# Patient Record
Sex: MALE | Race: OTHER | Employment: STUDENT | URBAN - METROPOLITAN AREA
[De-identification: names, ages, dates, MRNs, and addresses within clinical notes are randomized per-mention and may not be internally consistent; named-entity substitution may affect disease eponyms.]

---
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
## 2021-07-21 ENCOUNTER — HOSPITAL ENCOUNTER (EMERGENCY)
Facility: HOSPITAL | Age: 21
Discharge: HOME | End: 2021-07-21
Payer: MEDICAID

## 2021-07-21 ENCOUNTER — APPOINTMENT (OUTPATIENT)
Dept: GENERAL RADIOLOGY | Facility: HOSPITAL | Age: 21
End: 2021-07-21
Payer: MEDICAID

## 2021-07-21 VITALS
HEIGHT: 68 IN | SYSTOLIC BLOOD PRESSURE: 128 MMHG | RESPIRATION RATE: 18 BRPM | HEART RATE: 81 BPM | WEIGHT: 153 LBS | OXYGEN SATURATION: 99 % | BODY MASS INDEX: 23.19 KG/M2 | DIASTOLIC BLOOD PRESSURE: 80 MMHG | TEMPERATURE: 99 F

## 2021-07-21 DIAGNOSIS — L08.9 INFECTED ABRASION OF RIGHT ANKLE, INITIAL ENCOUNTER: Primary | ICD-10-CM

## 2021-07-21 DIAGNOSIS — S90.511A INFECTED ABRASION OF RIGHT ANKLE, INITIAL ENCOUNTER: Primary | ICD-10-CM

## 2021-07-21 PROCEDURE — 73610 X-RAY EXAM OF ANKLE: CPT

## 2021-07-21 PROCEDURE — 99283 EMERGENCY DEPT VISIT LOW MDM: CPT

## 2021-07-21 RX ORDER — CEPHALEXIN 500 MG/1
500 CAPSULE ORAL 3 TIMES DAILY
Qty: 21 CAPSULE | Refills: 0 | Status: SHIPPED | OUTPATIENT
Start: 2021-07-21 | End: 2021-07-28

## 2021-07-21 NOTE — ED INITIAL ASSESSMENT (HPI)
Pt reports that he was lifting weight on Friday and he dropped the bar on his right ankle. He has an abrasion noted with swelling. Pt is concerned that the swelling hasnt improved.  Tetanus shot most likely not utd

## 2021-07-22 NOTE — ED PROVIDER NOTES
Patient Seen in: [facility] AND [facility] Emergency Department      History   Patient presents with:  Swelling Edema    Stated Complaint: right foot injury fri    HPI/Subjective:   HPI    15-year-old male presents to the emergency department with complaints of Findings: Erythema present.       Comments: 1.5 cm annular subacute circular laceration/ abrasion with scab to the right anterior ankle there is surrounding erythema and warmth no abscess or fluctuance/   Neurological:      General: No focal deficit present

## 2022-08-04 ENCOUNTER — TELEPHONE (OUTPATIENT)
Dept: GASTROENTEROLOGY | Facility: CLINIC | Age: 22
End: 2022-08-04

## 2022-08-04 ENCOUNTER — OFFICE VISIT (OUTPATIENT)
Dept: FAMILY MEDICINE CLINIC | Facility: CLINIC | Age: 22
End: 2022-08-04
Payer: MEDICAID

## 2022-08-04 VITALS
BODY MASS INDEX: 22.43 KG/M2 | WEIGHT: 148 LBS | HEIGHT: 68 IN | SYSTOLIC BLOOD PRESSURE: 137 MMHG | HEART RATE: 88 BPM | DIASTOLIC BLOOD PRESSURE: 82 MMHG

## 2022-08-04 DIAGNOSIS — R19.7 DIARRHEA, UNSPECIFIED TYPE: Primary | ICD-10-CM

## 2022-08-04 PROCEDURE — 3075F SYST BP GE 130 - 139MM HG: CPT | Performed by: FAMILY MEDICINE

## 2022-08-04 PROCEDURE — 99204 OFFICE O/P NEW MOD 45 MIN: CPT | Performed by: FAMILY MEDICINE

## 2022-08-04 PROCEDURE — 3079F DIAST BP 80-89 MM HG: CPT | Performed by: FAMILY MEDICINE

## 2022-08-04 PROCEDURE — 3008F BODY MASS INDEX DOCD: CPT | Performed by: FAMILY MEDICINE

## 2022-08-04 NOTE — TELEPHONE ENCOUNTER
GI RNs -    New patient. Please call Umm Mann to get him in for an expedited office visit with me week of 8/15/2022 or following week, Monday afternoon if possible. Urgent. Dr. Mason Mabry called me regarding this patient who has been suffering at least several weeks of chronic diarrhea possibly bloody. Dr. Mason Mabry is ordering stool studies. Outside labs show mildly elevated CRP.     - cb

## 2022-08-04 NOTE — TELEPHONE ENCOUNTER
Dr Shay Lin,    I spoke to the pt and he accepted 08/22/22 @ 2:30    He will get the stool cultures completed tomorrow

## 2022-08-04 NOTE — TELEPHONE ENCOUNTER
Dr Vi Orlando,    Pt declined appt on 08/15/22     Pt is leaving for [facility] on 08/10/22 & returning on 08/21/22    First appt upon your return is 09/16/2022

## 2022-08-04 NOTE — TELEPHONE ENCOUNTER
Thanks Tona Wen!! If Nicole Montes keeps getting worse, he may need to come back earlier from [facility].  I could see him Monday afternoon 8/22 or at 4 PM end of day on 8/25/2022, otherwise Monday afternoon 8/29/2022. If he can come in for those stool tests today or tomorrow, I could send in a prescription for prednisone which will probably help.   I need to see those stool tests first.

## 2022-08-05 ENCOUNTER — LAB ENCOUNTER (OUTPATIENT)
Dept: LAB | Facility: HOSPITAL | Age: 22
End: 2022-08-05
Attending: FAMILY MEDICINE
Payer: MEDICAID

## 2022-08-05 DIAGNOSIS — R19.7 DIARRHEA, UNSPECIFIED TYPE: ICD-10-CM

## 2022-08-05 LAB
CRYPTOSP AG STL QL IA: NEGATIVE
G LAMBLIA AG STL QL IA: NEGATIVE

## 2022-08-05 PROCEDURE — 83993 ASSAY FOR CALPROTECTIN FECAL: CPT

## 2022-08-05 PROCEDURE — 87272 CRYPTOSPORIDIUM AG IF: CPT

## 2022-08-05 PROCEDURE — 87493 C DIFF AMPLIFIED PROBE: CPT

## 2022-08-05 PROCEDURE — 87329 GIARDIA AG IA: CPT

## 2022-08-06 LAB — C DIFF TOX B STL QL: NEGATIVE

## 2022-08-09 LAB — CALPROTECTIN STL-MCNT: >800 ΜG/G (ref ?–50)

## 2022-08-29 ENCOUNTER — OFFICE VISIT (OUTPATIENT)
Dept: GASTROENTEROLOGY | Facility: CLINIC | Age: 22
End: 2022-08-29
Payer: MEDICAID

## 2022-08-29 ENCOUNTER — TELEPHONE (OUTPATIENT)
Dept: GASTROENTEROLOGY | Facility: CLINIC | Age: 22
End: 2022-08-29

## 2022-08-29 VITALS
SYSTOLIC BLOOD PRESSURE: 131 MMHG | HEART RATE: 79 BPM | HEIGHT: 68 IN | DIASTOLIC BLOOD PRESSURE: 76 MMHG | WEIGHT: 148 LBS | BODY MASS INDEX: 22.43 KG/M2

## 2022-08-29 DIAGNOSIS — R19.4 CHANGE IN BOWEL HABITS: Primary | ICD-10-CM

## 2022-08-29 DIAGNOSIS — K92.1 BLOOD IN STOOL: ICD-10-CM

## 2022-08-29 DIAGNOSIS — R19.7 DIARRHEA, UNSPECIFIED TYPE: ICD-10-CM

## 2022-08-29 RX ORDER — POLYETHYLENE GLYCOL 3350, SODIUM CHLORIDE, SODIUM BICARBONATE, POTASSIUM CHLORIDE 420; 11.2; 5.72; 1.48 G/4L; G/4L; G/4L; G/4L
POWDER, FOR SOLUTION ORAL
Qty: 4000 ML | Refills: 0 | Status: SHIPPED | OUTPATIENT
Start: 2022-08-29

## 2022-08-29 NOTE — TELEPHONE ENCOUNTER
Scheduled for: Colonoscopy [address] per Traci (LEONOR) and Dr. Andrew Leroy  Provider Name:  Dr Andrew Leroy  Date:  10/11/2022  Location:  [facility]  Sedation:  MAC  Time: 3:30pm, arrival 2:30pm  Prep:  Golytely  Meds/Allergies Reconciled?:  Physician reviewed     Diagnosis with codes:  Change in bowel habits R19.4, Blood in stool K92.1  Was patient informed to call insurance with codes (Y/N):  Yes      Referral sent?:  Referral was sent at the time of electronic surgical scheduling. [facility] or [facility] Th  notified?:  I sent an electronic request to Endo Scheduling and received a confirmation today.      Medication Orders:  N/A  Misc Orders:  N/A     Further instructions given by staff:  Prep instructions were given to patient

## 2022-10-10 ENCOUNTER — TELEPHONE (OUTPATIENT)
Dept: GASTROENTEROLOGY | Facility: CLINIC | Age: 22
End: 2022-10-10

## 2022-10-10 DIAGNOSIS — R19.4 CHANGE IN BOWEL HABITS: Primary | ICD-10-CM

## 2022-10-10 DIAGNOSIS — K92.1 BLOOD IN STOOL: ICD-10-CM

## 2022-10-10 NOTE — TELEPHONE ENCOUNTER
See message below- cancelled in epic and endo.     CANCELLED for: Colonoscopy [address] per Traci (ENDO) and Dr. Harris Davis  Provider Name:  Dr Harris Davis  Date:  10/11/2022  Location:  [facility]  Sedation:  MAC  Time: 3:30pm, arrival 2:30pm

## 2023-06-01 ENCOUNTER — TELEPHONE (OUTPATIENT)
Facility: CLINIC | Age: 23
End: 2023-06-01

## 2023-06-01 ENCOUNTER — HOSPITAL ENCOUNTER (EMERGENCY)
Facility: HOSPITAL | Age: 23
Discharge: HOME | End: 2023-06-01
Attending: EMERGENCY MEDICINE
Payer: MEDICAID

## 2023-06-01 VITALS
OXYGEN SATURATION: 99 % | HEART RATE: 70 BPM | DIASTOLIC BLOOD PRESSURE: 73 MMHG | RESPIRATION RATE: 18 BRPM | SYSTOLIC BLOOD PRESSURE: 132 MMHG | TEMPERATURE: 99 F

## 2023-06-01 DIAGNOSIS — R19.7 DIARRHEA, UNSPECIFIED TYPE: Primary | ICD-10-CM

## 2023-06-01 LAB
ALBUMIN SERPL-MCNC: 3.5 G/DL (ref 3.4–5)
ALBUMIN/GLOB SERPL: 0.9 {RATIO} (ref 1–2)
ALP LIVER SERPL-CCNC: 104 U/L
ALT SERPL-CCNC: 33 U/L
ANION GAP SERPL CALC-SCNC: 10 MMOL/L (ref 0–18)
AST SERPL-CCNC: 22 U/L (ref 15–37)
BASOPHILS # BLD AUTO: 0.06 X10(3) UL (ref 0–0.2)
BASOPHILS NFR BLD AUTO: 0.5 %
BILIRUB SERPL-MCNC: 0.3 MG/DL (ref 0.1–2)
BUN BLD-MCNC: 16 MG/DL (ref 7–18)
BUN/CREAT SERPL: 16.3 (ref 10–20)
CALCIUM BLD-MCNC: 8.7 MG/DL (ref 8.5–10.1)
CHLORIDE SERPL-SCNC: 107 MMOL/L (ref 98–112)
CO2 SERPL-SCNC: 25 MMOL/L (ref 21–32)
CREAT BLD-MCNC: 0.98 MG/DL
DEPRECATED RDW RBC AUTO: 39.9 FL (ref 35.1–46.3)
EOSINOPHIL # BLD AUTO: 0.47 X10(3) UL (ref 0–0.7)
EOSINOPHIL NFR BLD AUTO: 4.1 %
ERYTHROCYTE [DISTWIDTH] IN BLOOD BY AUTOMATED COUNT: 13.6 % (ref 11–15)
GFR SERPLBLD BASED ON 1.73 SQ M-ARVRAT: 111 ML/MIN/1.73M2 (ref 60–?)
GLOBULIN PLAS-MCNC: 3.8 G/DL (ref 2.8–4.4)
GLUCOSE BLD-MCNC: 95 MG/DL (ref 70–99)
HCT VFR BLD AUTO: 41.1 %
HGB BLD-MCNC: 13.8 G/DL
IMM GRANULOCYTES # BLD AUTO: 0.03 X10(3) UL (ref 0–1)
IMM GRANULOCYTES NFR BLD: 0.3 %
LYMPHOCYTES # BLD AUTO: 2.59 X10(3) UL (ref 1–4)
LYMPHOCYTES NFR BLD AUTO: 22.4 %
MCH RBC QN AUTO: 27.1 PG (ref 26–34)
MCHC RBC AUTO-ENTMCNC: 33.6 G/DL (ref 31–37)
MCV RBC AUTO: 80.6 FL
MONOCYTES # BLD AUTO: 1.6 X10(3) UL (ref 0.1–1)
MONOCYTES NFR BLD AUTO: 13.8 %
NEUTROPHILS # BLD AUTO: 6.82 X10 (3) UL (ref 1.5–7.7)
NEUTROPHILS # BLD AUTO: 6.82 X10(3) UL (ref 1.5–7.7)
NEUTROPHILS NFR BLD AUTO: 58.9 %
OSMOLALITY SERPL CALC.SUM OF ELEC: 295 MOSM/KG (ref 275–295)
PLATELET # BLD AUTO: 287 10(3)UL (ref 150–450)
POTASSIUM SERPL-SCNC: 3.7 MMOL/L (ref 3.5–5.1)
PROT SERPL-MCNC: 7.3 G/DL (ref 6.4–8.2)
RBC # BLD AUTO: 5.1 X10(6)UL
SODIUM SERPL-SCNC: 142 MMOL/L (ref 136–145)
WBC # BLD AUTO: 11.6 X10(3) UL (ref 4–11)

## 2023-06-01 PROCEDURE — 80053 COMPREHEN METABOLIC PANEL: CPT | Performed by: EMERGENCY MEDICINE

## 2023-06-01 PROCEDURE — 99283 EMERGENCY DEPT VISIT LOW MDM: CPT

## 2023-06-01 PROCEDURE — 36415 COLL VENOUS BLD VENIPUNCTURE: CPT

## 2023-06-01 PROCEDURE — 99284 EMERGENCY DEPT VISIT MOD MDM: CPT

## 2023-06-01 PROCEDURE — 85025 COMPLETE CBC W/AUTO DIFF WBC: CPT | Performed by: EMERGENCY MEDICINE

## 2023-06-01 NOTE — CM/SW NOTE
Per ERMD, patient needs a close follow up with Dr. Radha Gurrola, GI post discharge from ER. Patient saw Dr. Radha Gurrola October 2022 and had a scheduled colonoscopy that was cancelled due to resolutions of patient's symptoms. Patient now in ER for diarrhea with increasing amounts of blood, so called Dr. Elinor Ross office at [id number] and spoke with [facility] to schedule an appt. [facility] stated that Dr. Radha Gurrola is scheduling out to the Fall 2023 already and she will send him a message to see if he can see patient sooner. [facility] stated that they will call patient with f/u appt. [facility] called patient and informed him of above and he v/u.    930am    Received call from [employer] with Dr. Radha Gurrola and she stated Dr. Radha Gurrola is out of town until next week but that she will speak with GI physicians to see if patient can be scheduled for an early appt. Donny Delacruz RN stated that they will reach out to patient with appt.

## 2023-06-01 NOTE — DISCHARGE INSTRUCTIONS
Stay hydrated. Make an appointment to be seen by gastroenterology for follow-up. Return to the ER if you develop worsening symptoms, inability to tolerate fluids, or any emergent concerns.

## 2023-06-01 NOTE — TELEPHONE ENCOUNTER
I spoke with Umm/ER . She states patient needs to make a ER f/u appointment with Dr. Shay Lin for diarrhea/blood in stool. Dr. Shay Lin is out of the office this week. Patient contacted and appointment made for 06/05/2023 at 9 am at [facility] with Dr. Shay Lin. Patient advised to arrive 15 minutes prior to appointment and address given. Patient voiced understanding.

## 2023-06-01 NOTE — TELEPHONE ENCOUNTER
Umm/ED Case Manager called to see if pt could see Dr. Don Galindo earlier than first available. Pt was seen in ED for diarrhea with blood in it today. Please call pt to schedule appt.

## 2023-06-01 NOTE — ED INITIAL ASSESSMENT (HPI)
Patient presents with diarrhea for about a month and noticing more blood with it. Pt states this happens every year around this time - had seen a GI and was going to get an endoscopy but cancelled the apt d/t symptoms resolving.

## 2023-06-02 ENCOUNTER — OFFICE VISIT (OUTPATIENT)
Dept: FAMILY MEDICINE CLINIC | Facility: CLINIC | Age: 23
End: 2023-06-02

## 2023-06-02 VITALS
RESPIRATION RATE: 26 BRPM | OXYGEN SATURATION: 97 % | WEIGHT: 148 LBS | HEIGHT: 68 IN | BODY MASS INDEX: 22.43 KG/M2 | DIASTOLIC BLOOD PRESSURE: 80 MMHG | HEART RATE: 80 BPM | SYSTOLIC BLOOD PRESSURE: 116 MMHG

## 2023-06-02 DIAGNOSIS — R19.7 DIARRHEA, UNSPECIFIED TYPE: Primary | ICD-10-CM

## 2023-06-02 PROCEDURE — 3008F BODY MASS INDEX DOCD: CPT | Performed by: FAMILY MEDICINE

## 2023-06-02 PROCEDURE — 3074F SYST BP LT 130 MM HG: CPT | Performed by: FAMILY MEDICINE

## 2023-06-02 PROCEDURE — 3079F DIAST BP 80-89 MM HG: CPT | Performed by: FAMILY MEDICINE

## 2023-06-02 PROCEDURE — 99213 OFFICE O/P EST LOW 20 MIN: CPT | Performed by: FAMILY MEDICINE

## 2023-06-05 ENCOUNTER — OFFICE VISIT (OUTPATIENT)
Facility: CLINIC | Age: 23
End: 2023-06-05

## 2023-06-05 ENCOUNTER — TELEPHONE (OUTPATIENT)
Facility: CLINIC | Age: 23
End: 2023-06-05

## 2023-06-05 VITALS
HEIGHT: 68 IN | DIASTOLIC BLOOD PRESSURE: 76 MMHG | SYSTOLIC BLOOD PRESSURE: 123 MMHG | WEIGHT: 147.5 LBS | BODY MASS INDEX: 22.35 KG/M2 | HEART RATE: 91 BPM

## 2023-06-05 DIAGNOSIS — K92.1 BLOODY STOOLS: ICD-10-CM

## 2023-06-05 DIAGNOSIS — R79.82 ELEVATED C-REACTIVE PROTEIN (CRP): ICD-10-CM

## 2023-06-05 DIAGNOSIS — K92.1 BLOOD IN STOOL: ICD-10-CM

## 2023-06-05 DIAGNOSIS — R19.5 ELEVATED FECAL CALPROTECTIN: ICD-10-CM

## 2023-06-05 DIAGNOSIS — R19.7 DIARRHEA, UNSPECIFIED TYPE: Primary | ICD-10-CM

## 2023-06-05 DIAGNOSIS — R19.5 ABNORMAL STOOL TEST: Primary | ICD-10-CM

## 2023-06-05 RX ORDER — POLYETHYLENE GLYCOL 3350, SODIUM SULFATE ANHYDROUS, SODIUM BICARBONATE, SODIUM CHLORIDE, POTASSIUM CHLORIDE 236; 22.74; 6.74; 5.86; 2.97 G/4L; G/4L; G/4L; G/4L; G/4L
4 POWDER, FOR SOLUTION ORAL ONCE
Qty: 1 EACH | Refills: 0 | Status: SHIPPED | OUTPATIENT
Start: 2023-06-05 | End: 2023-06-05

## 2023-06-05 RX ORDER — PREDNISONE 10 MG/1
40 TABLET ORAL DAILY
Qty: 120 TABLET | Refills: 2 | Status: SHIPPED | OUTPATIENT
Start: 2023-06-05 | End: 2023-07-05

## 2023-06-05 NOTE — TELEPHONE ENCOUNTER
Excused letter  For work was generated and was given to patient today. Scheduled for:  Colonoscopy [address] , [id number]   Provider Name:  Geoff Vasquez  Date:  6/12/2023  Location:  [facility]   Sedation:  Mac   AENO:9639 Am (pt is aware to arrive at 0730 Am )   Prep: Split dose Golytely  Prep instructions were given to pt in the office, I discuss prep Instructions with patient at the time of the appointment which he verbally understood and given the prep instructions at the time of the appointment  Meds/Allergies Reconciled?:  Physician reviewed   Diagnosis with codes:  Bloody diarrhea K92.1, Abnormal stool test R19.5  Was patient informed to call insurance with codes (Y/N):  Yes, I confirmed MEDICAID REPLACEMENT  insurance with the patient. The patient also verbally understands to call his  insurance to check for pre-cert, codes were given on prep instructions. Referral sent?:  Referral was sent at the time of electronic surgical scheduling. [address] or [address] notified?:  I sent an electronic request to Endo Scheduling and received a confirmation today. Medication Orders: This patient verbally confirmed that he is not taking:   Iron, blood thinners, BP meds, and is not diabetic   Not latex allergy, Not PCN allergy and does not have a pacemaker Pt is aware to NOT take iron pills, herbal meds and diet supplements for 7 days before exam. Also to NOT take any form of alcohol, recreational drugs and any forms of ED meds 24 hours before exam.    Misc Orders:  Patient verbally understood & will await a phone call from [facility] to schedule.       Further instructions given by staff:

## 2023-06-12 ENCOUNTER — HOSPITAL ENCOUNTER (OUTPATIENT)
Facility: HOSPITAL | Age: 23
Setting detail: HOSPITAL OUTPATIENT SURGERY
Discharge: HOME | End: 2023-06-12
Attending: INTERNAL MEDICINE | Admitting: INTERNAL MEDICINE
Payer: MEDICAID

## 2023-06-12 ENCOUNTER — ANESTHESIA (OUTPATIENT)
Dept: ENDOSCOPY | Facility: HOSPITAL | Age: 23
End: 2023-06-12
Payer: MEDICAID

## 2023-06-12 ENCOUNTER — LAB ENCOUNTER (OUTPATIENT)
Dept: LAB | Facility: HOSPITAL | Age: 23
End: 2023-06-12
Attending: INTERNAL MEDICINE
Payer: MEDICAID

## 2023-06-12 ENCOUNTER — TELEPHONE (OUTPATIENT)
Dept: GASTROENTEROLOGY | Facility: CLINIC | Age: 23
End: 2023-06-12

## 2023-06-12 ENCOUNTER — ANESTHESIA EVENT (OUTPATIENT)
Dept: ENDOSCOPY | Facility: HOSPITAL | Age: 23
End: 2023-06-12
Payer: MEDICAID

## 2023-06-12 VITALS
TEMPERATURE: 98 F | BODY MASS INDEX: 22.73 KG/M2 | HEIGHT: 68 IN | RESPIRATION RATE: 20 BRPM | SYSTOLIC BLOOD PRESSURE: 126 MMHG | OXYGEN SATURATION: 98 % | WEIGHT: 150 LBS | HEART RATE: 75 BPM | DIASTOLIC BLOOD PRESSURE: 77 MMHG

## 2023-06-12 DIAGNOSIS — K92.1 BLOODY STOOLS: ICD-10-CM

## 2023-06-12 DIAGNOSIS — Z79.620 ENCOUNTER FOR MONITORING OF ADALIMUMAB THERAPY: ICD-10-CM

## 2023-06-12 DIAGNOSIS — Z51.81 ENCOUNTER FOR MONITORING OF ADALIMUMAB THERAPY: ICD-10-CM

## 2023-06-12 DIAGNOSIS — K52.9 CHRONIC DIARRHEA: ICD-10-CM

## 2023-06-12 DIAGNOSIS — K51.011 ULCERATIVE PANCOLITIS WITH RECTAL BLEEDING (HCC): Primary | ICD-10-CM

## 2023-06-12 DIAGNOSIS — K51.011 ULCERATIVE PANCOLITIS WITH RECTAL BLEEDING (HCC): ICD-10-CM

## 2023-06-12 DIAGNOSIS — R19.5 ABNORMAL STOOL TEST: ICD-10-CM

## 2023-06-12 LAB
ALBUMIN SERPL-MCNC: 3.3 G/DL (ref 3.4–5)
CRP SERPL-MCNC: 2.79 MG/DL (ref ?–0.3)
DEPRECATED RDW RBC AUTO: 40.5 FL (ref 35.1–46.3)
ERYTHROCYTE [DISTWIDTH] IN BLOOD BY AUTOMATED COUNT: 13.5 % (ref 11–15)
HAV AB SER QL IA: REACTIVE
HBV CORE AB SERPL QL IA: NONREACTIVE
HBV SURFACE AB SER QL: NONREACTIVE
HBV SURFACE AB SERPL IA-ACNC: <3.1 MIU/ML
HBV SURFACE AG SERPL QL IA: NONREACTIVE
HCT VFR BLD AUTO: 43 %
HCV AB SERPL QL IA: NONREACTIVE
HGB BLD-MCNC: 13.6 G/DL
MCH RBC QN AUTO: 26.1 PG (ref 26–34)
MCHC RBC AUTO-ENTMCNC: 31.6 G/DL (ref 31–37)
MCV RBC AUTO: 82.5 FL
PLATELET # BLD AUTO: 407 10(3)UL (ref 150–450)
RBC # BLD AUTO: 5.21 X10(6)UL
WBC # BLD AUTO: 14.9 X10(3) UL (ref 4–11)

## 2023-06-12 PROCEDURE — 86706 HEP B SURFACE ANTIBODY: CPT

## 2023-06-12 PROCEDURE — 0DDP8ZX EXTRACTION OF RECTUM, VIA NATURAL OR ARTIFICIAL OPENING ENDOSCOPIC, DIAGNOSTIC: ICD-10-PCS | Performed by: INTERNAL MEDICINE

## 2023-06-12 PROCEDURE — 85027 COMPLETE CBC AUTOMATED: CPT

## 2023-06-12 PROCEDURE — 36415 COLL VENOUS BLD VENIPUNCTURE: CPT

## 2023-06-12 PROCEDURE — 82040 ASSAY OF SERUM ALBUMIN: CPT

## 2023-06-12 PROCEDURE — 0DDN8ZX EXTRACTION OF SIGMOID COLON, VIA NATURAL OR ARTIFICIAL OPENING ENDOSCOPIC, DIAGNOSTIC: ICD-10-PCS | Performed by: INTERNAL MEDICINE

## 2023-06-12 PROCEDURE — 86140 C-REACTIVE PROTEIN: CPT

## 2023-06-12 PROCEDURE — 45380 COLONOSCOPY AND BIOPSY: CPT | Performed by: INTERNAL MEDICINE

## 2023-06-12 PROCEDURE — 86708 HEPATITIS A ANTIBODY: CPT

## 2023-06-12 PROCEDURE — 86803 HEPATITIS C AB TEST: CPT

## 2023-06-12 PROCEDURE — 0DDK8ZX EXTRACTION OF ASCENDING COLON, VIA NATURAL OR ARTIFICIAL OPENING ENDOSCOPIC, DIAGNOSTIC: ICD-10-PCS | Performed by: INTERNAL MEDICINE

## 2023-06-12 PROCEDURE — 80503 PATH CLIN CONSLTJ SF 5-20: CPT

## 2023-06-12 PROCEDURE — 0DDL8ZX EXTRACTION OF TRANSVERSE COLON, VIA NATURAL OR ARTIFICIAL OPENING ENDOSCOPIC, DIAGNOSTIC: ICD-10-PCS | Performed by: INTERNAL MEDICINE

## 2023-06-12 PROCEDURE — 86704 HEP B CORE ANTIBODY TOTAL: CPT

## 2023-06-12 PROCEDURE — 86480 TB TEST CELL IMMUN MEASURE: CPT

## 2023-06-12 PROCEDURE — 87340 HEPATITIS B SURFACE AG IA: CPT

## 2023-06-12 PROCEDURE — 86709 HEPATITIS A IGM ANTIBODY: CPT

## 2023-06-12 RX ORDER — MIDAZOLAM HYDROCHLORIDE 1 MG/ML
INJECTION INTRAMUSCULAR; INTRAVENOUS AS NEEDED
Status: DISCONTINUED | OUTPATIENT
Start: 2023-06-12 | End: 2023-06-12 | Stop reason: SURG

## 2023-06-12 RX ORDER — SODIUM CHLORIDE, SODIUM LACTATE, POTASSIUM CHLORIDE, CALCIUM CHLORIDE 600; 310; 30; 20 MG/100ML; MG/100ML; MG/100ML; MG/100ML
INJECTION, SOLUTION INTRAVENOUS CONTINUOUS
Status: DISCONTINUED | OUTPATIENT
Start: 2023-06-12 | End: 2023-06-12

## 2023-06-12 RX ORDER — NALOXONE HYDROCHLORIDE 0.4 MG/ML
80 INJECTION, SOLUTION INTRAMUSCULAR; INTRAVENOUS; SUBCUTANEOUS AS NEEDED
Status: DISCONTINUED | OUTPATIENT
Start: 2023-06-12 | End: 2023-06-12

## 2023-06-12 RX ORDER — LIDOCAINE HYDROCHLORIDE 10 MG/ML
INJECTION, SOLUTION EPIDURAL; INFILTRATION; INTRACAUDAL; PERINEURAL AS NEEDED
Status: DISCONTINUED | OUTPATIENT
Start: 2023-06-12 | End: 2023-06-12 | Stop reason: SURG

## 2023-06-12 RX ADMIN — MIDAZOLAM HYDROCHLORIDE 2 MG: 1 INJECTION INTRAMUSCULAR; INTRAVENOUS at 09:03:00

## 2023-06-12 RX ADMIN — SODIUM CHLORIDE, SODIUM LACTATE, POTASSIUM CHLORIDE, CALCIUM CHLORIDE: 600; 310; 30; 20 INJECTION, SOLUTION INTRAVENOUS at 09:34:00

## 2023-06-12 RX ADMIN — LIDOCAINE HYDROCHLORIDE 50 MG: 10 INJECTION, SOLUTION EPIDURAL; INFILTRATION; INTRACAUDAL; PERINEURAL at 09:06:00

## 2023-06-12 RX ADMIN — SODIUM CHLORIDE, SODIUM LACTATE, POTASSIUM CHLORIDE, CALCIUM CHLORIDE: 600; 310; 30; 20 INJECTION, SOLUTION INTRAVENOUS at 09:03:00

## 2023-06-12 NOTE — TELEPHONE ENCOUNTER
Colonoscopy exam today verifies IBD/ ulcerative colitis; Mcelroy score 2. Pre-treatment labs ordered today.     - cb

## 2023-06-12 NOTE — ANESTHESIA POSTPROCEDURE EVALUATION
Patient: Tucker Frances    Procedure Summary     Date: 06/12/23 Room / Location: [address] [place] [address] ENDOSCOPY 01 / [facility] ENDOSCOPY    Anesthesia Start: 0806 Anesthesia Stop: 6003    Procedure: COLONOSCOPY Diagnosis:       Abnormal stool test      Bloody stools      (colitis )    Surgeons: Lora Hidalgo MD Anesthesiologist: Kelton Lynne MD    Anesthesia Type: MAC, general ASA Status: 2          Anesthesia Type: MAC, general    Vitals Value Taken Time   /70 06/12/23 0934   Temp - 06/12/23 0935   Pulse 80 06/12/23 0934   Resp 16 06/12/23 0934   SpO2 99 % 06/12/23 0934       [facility] AN Post Evaluation:   Patient Evaluated in PACU  Patient Participation: complete - patient participated  Level of Consciousness: awake and alert  Pain Score: 0  Pain Management: adequate  Airway Patency:patent  Dental exam unchanged from preop  Yes    Cardiovascular Status: acceptable  Respiratory Status: acceptable and nasal cannula  Postoperative Hydration acceptable      Caesar Cabezas CRNA  6/12/2023 9:35 AM

## 2023-06-12 NOTE — TELEPHONE ENCOUNTER
GI RNs-    Colonoscopy examination performed today shows ulcerative colitis as expected. I asked Richa Stoddard to go today for more labs to prepare for treatment of the ulcerative colitis. Please call . Neal Ron this week to follow-up. Please ask him whether he has made a decision regarding taking Humira self injections every 14 days versus Remicade IV infusions every 8 weeks. We discussed this briefly after today's colonoscopy examination. We need to order 1 of those treatments for him. Richa Stoddard is on prednisone. He should be on 4 pills / 40 mg/day and feel he is getting better. Once he is sure things are improving, he should go down to 3 pills or 30 mg for 5 days and then call us to check in. Please try to find Richa Stoddard a follow-up appointment with me in 3 to 4 weeks and another appointment about 6 weeks after that. Mondays or RN hold spots are okay.     - cb

## 2023-06-12 NOTE — DISCHARGE INSTRUCTIONS
.  .  .  Notes from Dr. Aldridge Dies:    Today's colonoscopy examination shows \"ulcerative colitis,\" which is what I suspected. This is a chronic inflammation disease of the colon, or large intestine. I took biopsies during today's exam to verify the diagnosis. You will probably see more bleeding today. Please continue on that prednisone medication until you start feeling better, hopefully in the next week or so. Once you start feeling better we can reduce the dose of the prednisone down to 30 mg/day and down from there. Prednisone is only temporary. We will need to start you on a long-term medication to keep this colitis under control to give you relief from the recent symptoms and so that it does not get any worse. [facility] Instructions for Colonoscopy with Sedation    Diet:  - Resume your regular diet as tolerated unless otherwise instructed. - Start with light meals to minimize bloating.  - Do not drink alcohol today. Medication:  - If you have questions about resuming your normal medications, please contact your Primary Care Physician. Activities:  - Take it easy today. Do not return to work today. - Do not drive today. - Do not operate any machinery today (including kitchen equipment). Colonoscopy:  - You may notice some rectal \"spotting\" (a little blood on the toilet tissue) for a day or two after the exam. This is normal.  - If you experience any rectal bleeding (not spotting), persistent tenderness or sharp severe abdominal pains, oral temperature over 100 degrees Fahrenheit, light-headedness or dizziness, or any other problems, contact your doctor. **If unable to reach your doctor, please go to the [facility] Emergency Room**    - Your referring physician will receive a full report of your examination.  - If you do not hear from your doctor's office within two weeks of your biopsy, please call them for your results.     You may be able to see your laboratory results in [place] between 4 and 7 business days. In some cases, your physician may not have viewed the results before they are released to [address] If you have questions regarding your results contact the physician who ordered the test/exam by phone or via [address] by choosing \"Ask a Medical Question. \"

## 2023-06-13 LAB — HAV IGM SER QL: NONREACTIVE

## 2023-06-14 LAB
M TB IFN-G CD4+ T-CELLS BLD-ACNC: 0.06 IU/ML
M TB TUBERC IFN-G BLD QL: NEGATIVE
M TB TUBERC IGNF/MITOGEN IGNF CONTROL: >10 IU/ML
QFT TB1 AG MINUS NIL: -0.02 IU/ML
QFT TB2 AG MINUS NIL: -0.02 IU/ML

## 2023-06-14 NOTE — TELEPHONE ENCOUNTER
Dr. Janey Koyanagi,    I spoke to Amarilis, states he is doing ok. He doesn't feel he is very well informed on the two different medications and what would be best for him. He wanted to get some guidance from you on what you think would be best and will go that route, he is not opposed to either/or. He also requested a note for work to excuse him until Saturday 6/17 due to pain in upper part of stomach, feels like there is a golf ball stuck there. Denies trouble breathing/swallowing. Please advise, thank you!

## 2023-06-20 RX ORDER — ADALIMUMAB 40MG/0.4ML
KIT SUBCUTANEOUS
Qty: 2 EACH | Refills: 11 | Status: SHIPPED | OUTPATIENT
Start: 2023-06-20

## 2023-06-20 RX ORDER — ADALIMUMAB 80MG/0.8ML
KIT SUBCUTANEOUS
Qty: 3 EACH | Refills: 0 | Status: SHIPPED | OUTPATIENT
Start: 2023-06-20

## 2023-06-20 NOTE — TELEPHONE ENCOUNTER
GI RNs,    See below. Please call Biju Lai to arrange for him to come  the letter that I wrote today.     - cb

## 2023-06-21 NOTE — TELEPHONE ENCOUNTER
Spoke to patient and let him know that letter was placed at [facility] [employer] gastro . Location provided.

## 2023-06-22 ENCOUNTER — TELEPHONE (OUTPATIENT)
Facility: CLINIC | Age: 23
End: 2023-06-22

## 2023-06-23 NOTE — TELEPHONE ENCOUNTER
Called patient ,verified , in regards to a letter for him to  in the office. Patient stated he already pick them up and I told him he has a copy also in My Chart ,he said oh okay and was thankful and appreciated with this phone calls.

## 2023-07-06 ENCOUNTER — TELEPHONE (OUTPATIENT)
Facility: CLINIC | Age: 23
End: 2023-07-06

## 2023-07-06 NOTE — TELEPHONE ENCOUNTER
Also see 6/22/2023 TE - patient states he received a phone call from [address] [facility] regarding Humira and was told he only has 24 hours to have forms completed before rx is placed on hold. Patient was told they are still waiting on response from doctor's office regarding additional information needed. Please call [facility] and then call patient with update. Thank you.

## 2023-07-06 NOTE — TELEPHONE ENCOUNTER
I spoke to CPT Neto @ [facility]. I gave her the information she needed for the clarification.     I called Niki Cox to let him know    He will call [facility] back to set up delivery

## 2023-08-28 ENCOUNTER — TELEPHONE (OUTPATIENT)
Facility: CLINIC | Age: 23
End: 2023-08-28

## 2023-08-28 DIAGNOSIS — R19.7 DIARRHEA, UNSPECIFIED TYPE: ICD-10-CM

## 2023-08-28 DIAGNOSIS — K51.011 ULCERATIVE PANCOLITIS WITH RECTAL BLEEDING (HCC): Primary | ICD-10-CM

## 2023-08-28 DIAGNOSIS — Z79.620 ENCOUNTER FOR MONITORING OF ADALIMUMAB THERAPY: ICD-10-CM

## 2023-08-28 DIAGNOSIS — Z51.81 ENCOUNTER FOR MONITORING OF ADALIMUMAB THERAPY: ICD-10-CM

## 2023-08-28 DIAGNOSIS — R19.7 BLOODY DIARRHEA: ICD-10-CM

## 2023-08-28 DIAGNOSIS — K51.911: ICD-10-CM

## 2023-08-28 DIAGNOSIS — D84.9 IMMUNOSUPPRESSION (HCC): ICD-10-CM

## 2023-08-28 NOTE — TELEPHONE ENCOUNTER
I spoke to [employer] with UC flare    About 2-3 weeks ago pt started to have 10-15 bloody bowel movements/day    He wakes up every morning needing to have a bowel movement    He does not have any nocturnal urges    He does experience cramping prior to bowel movements but no abdominal pain    He is taking his Humira every two weeks. Pt doesn't think the Humira ever really helped him    Pt received his  first starter dose on 07/14/2023 then 2 weeks later the second 80 mg/ml dose    He has now taken 2 maintenance doses of Humira 40mg/ml. He most recently took Humira this past Friday 08/25/2023    I scheduled a follow up appt for him.     Your Appointments      Monday September 11, 2023 11:00 AM  Follow Up Visit with MD Bentley Ambriz, [address] [place] [address],[address], [facility]) [facility],[address]  [phone]

## 2023-08-29 RX ORDER — PREDNISONE 10 MG/1
40 TABLET ORAL DAILY
Qty: 120 TABLET | Refills: 0 | Status: SHIPPED | OUTPATIENT
Start: 2023-08-29 | End: 2023-09-28

## 2023-08-29 RX ORDER — BUDESONIDE 3 MG/1
9 CAPSULE, COATED PELLETS ORAL EVERY MORNING
Qty: 90 CAPSULE | Refills: 5 | Status: SHIPPED | OUTPATIENT
Start: 2023-08-29 | End: 2023-09-03

## 2023-08-29 NOTE — TELEPHONE ENCOUNTER
Dr Vi Orlando,    I started the prior authorization for the budesonide. Nicole Montes will come to the [facility] and  the stool kits and return the specimens as soon as possible    Next Thursday 09/07/23 he will go to the lab for the blood work. He is due for the Humira on 09/08/2023. He is aware we are checking \"levels\" for his Humira.  He is also aware we will most likely be changing his biologic medication    He did voice a preference for an oral medication in the future    I will follow up on the prior authorization for the budesonide    Pt is aware that both budesonide and prednisone are at the pharmacy    He knows to wait to hear from me regarding the prior authorization for the budesonide

## 2023-08-29 NOTE — TELEPHONE ENCOUNTER
Thank you Nima Beck. That follow-up appointment as scheduled is perfect and very important. Likely pretty severe ulcerative colitis, probably will not be controlled on Humira. Please ask Mavis Delarosa to come in for another set of stool tests if he is willing. This is to rule out infection and measure how much inflammation. Very important. Please ask him to come in for blood test to check Humira level, CBC, CRP day before or morning of his next Humira dose if possible. Lets see if we can get Medicaid to cover him some budesonide. Prescription sent into [facility]. If the budesonide is not covered or not enough, he should start prednisone. I will send in a prescription so he has it ready in case things get worse or the budesonide is not covered or he just gets tired of this flare up. Please tell Mavis Delarosa not to start the prednisone unless the colitis becomes severe or budesonide is delayed. We will likely need to step up to a stronger therapy. Please asked Mavis Delarosa whether he would like to pursue intravenous (IV) infusions every 8 weeks which should be covered or if we try for the stronger pills (Xeljanz/Rinvoq).     - cb

## 2023-08-30 ENCOUNTER — TELEPHONE (OUTPATIENT)
Facility: CLINIC | Age: 23
End: 2023-08-30

## 2023-08-30 RX ORDER — BUDESONIDE 9 MG/1
9 TABLET, FILM COATED, EXTENDED RELEASE ORAL DAILY
Qty: 90 TABLET | Refills: 3 | Status: SHIPPED | OUTPATIENT
Start: 2023-08-30 | End: 2023-09-29

## 2023-08-30 NOTE — TELEPHONE ENCOUNTER
Dr Augusto Villavicencio,    I received a letter from [facility]    Budesonide is denied. Denial letter states that budesonide is not an accepted medication in the use of ulcerative colitis    Tracking number [id number].  Letter sent to [facility]    I called  pharmacy and I was able to apply a Good Rx coupon for the budesonide for $27.00/30 day supply    Price for the budesonide without the Good Rx coupon was over $2000.00    LM for Hector to call me back

## 2023-08-30 NOTE — TELEPHONE ENCOUNTER
That is just fine. If these bastards want to play the FDA approval game, then we can go with the more expensive option:  Elizabeth Veliz. Uceris Rx sent in today. Package insert from the Uceris medication:    UCERIS- budesonide tablet, extended release  [employer].  ----------  HIGHLIGHTS OF PRESCRIBING INFORMATION  These highlights do not include all the information needed to use UCERIS safely and effectively. See full  prescribing information for UCERIS. UCERIS (budesonide) extended-release tablets, for oral use  Initial [place].S. Approval: 1997    INDICATIONS AND USAGE  UCERIS (budesonide) is a glucocorticosteroid indicated for the induction of remission in patients with active, mild to moderate ulcerative colitis.  (1)

## 2023-08-31 RX ORDER — UPADACITINIB 45 MG/1
45 TABLET, EXTENDED RELEASE ORAL DAILY
Qty: 56 TABLET | Refills: 0 | Status: SHIPPED | OUTPATIENT
Start: 2023-08-31 | End: 2023-09-03

## 2023-08-31 RX ORDER — UPADACITINIB 30 MG/1
30 TABLET, EXTENDED RELEASE ORAL DAILY
Qty: 90 TABLET | Refills: 3 | Status: SHIPPED | OUTPATIENT
Start: 2023-08-31 | End: 2023-09-03

## 2023-08-31 RX ORDER — MESALAMINE 1.2 G/1
4.8 TABLET, DELAYED RELEASE ORAL DAILY
Qty: 120 TABLET | Refills: 3 | Status: SHIPPED | OUTPATIENT
Start: 2023-08-31 | End: 2023-09-30

## 2023-08-31 NOTE — TELEPHONE ENCOUNTER
Is it possible to do a peer to peer? The required meds are not indicated or effective for a UC FLARE and for induction. Those are very weak MAINTENANCE MEDS. Standard of care would be PREDNISONE which is toxic and high risk for a 21 yr old or BUDESONIDE. The listed meds are not effective or standard of care for a UC flare. This company is going to cause Nicole Montes to be hospitalized if they keep playing games with medications for a FLARE OF INFLAMMATORY BOWEL DISEASE. I am sending in Rx for Rinvoq/upadacitinib as Humira is not working and this company is blocking effective therapy.       - cb

## 2023-08-31 NOTE — TELEPHONE ENCOUNTER
Dr Skip Woods denied by [facility]    Denial states pt must have tried and failed 3 out of the 4 following medications:    Balsalazide  Mesalamine  Pentasa  Sulfasalazine     I spoke to [employer] out the Good Rx coupon isn't working for the budesonide either    Mavis Ice is going to start the prednisone 40 mg/day    He will call me next Thursday to let me know how he is doing    This is the same day he is going for the blood work    I will princess the chart in case he doesn't call me back

## 2023-09-01 NOTE — TELEPHONE ENCOUNTER
Dr Naman Hopper    We received another denial from [address] [place] [address],[address] for Rinvoq    Case # [id number]    Preferred medication is Padmini Cuello or Padmini Cuello ER

## 2023-09-01 NOTE — TELEPHONE ENCOUNTER
Dr Santino Fofana,    I spoke to Monserrat Sky. at [facility] ([phone])    I did set up a P2P for you    They could only narrow down the time to the next 3 business days    I gave them your cell #    Best I could do!!

## 2023-09-01 NOTE — TELEPHONE ENCOUNTER
[employer] Prior Authorization Request forms filled out for the loading and maintenance dose of Rinvoq and faxed to [employer] as an URGENT request [phone]    Will await a response

## 2023-09-01 NOTE — TELEPHONE ENCOUNTER
I called [facility] [employer] this morning. Almost 7 minutes playing games involving verifying my NPI number and discussing whether Uceris was tablets or capsules. Then put on hold and transferred to appeals department. Case # [id number]    [phone]    Over 12 minutes spent on hold listening to saxophone music with no agent condescending to take my call. Agent eventually picked up asked my full name and NPI everything all over again including NPI. When I objected she cut off the call.

## 2023-09-01 NOTE — TELEPHONE ENCOUNTER
[employer] calling states received fax however missing mediation and strength.     fax # [phone]

## 2023-09-03 RX ORDER — TOFACITINIB 22 MG/1
22 TABLET, FILM COATED, EXTENDED RELEASE ORAL DAILY
Qty: 30 TABLET | Refills: 3 | Status: SHIPPED | OUTPATIENT
Start: 2023-09-03 | End: 2023-09-05

## 2023-09-03 NOTE — TELEPHONE ENCOUNTER
THIRD denial of medications from these criminals at [address] noted. They have so far denied budesonide, Uceris, Rinvoq for a FLARE of ulcerative colitis. This is sabotage of this patient's care and deliberate harm to him. Medication list updated to reflect the multiple denials. Prescription sent in today for Antonieta Deed XR/tofacitinib. If [employer] again denies the XR we will step down to the lower dose and likely less effective tofacitinib 10 mg non- XR. Please call Lv Loera to follow-up with him on Tuesday. If he is not doing well, we will need to admit him due to this malignant behavior of his Medicaid coverage. Prolonged course of prednisone, which is the only option they are leaving us is inappropriate and unethical in a 35-year-old man. Thanks Mandy Mckeon.    - cb    ======  Antonieta Deed XR package insert:    Ulcerative Colitis: XELJANZ/XELJANZ XR is indicated for the treatment of adult patients with moderately to severely active ulcerative colitis (UC), who have had an inadequate response or intolerance to one or more TNF blockers. ... Ulcerative Colitis    Induction: XELJANZ 10 mg twice daily or XELJANZ XR 22 mg once daily for 8 weeks; evaluate patients and transition to maintenance therapy depending on therapeutic response. If needed, continue XELJANZ 10 mg twice daily or XELJANZ XR 22 mg once daily for a maximum of 16 weeks. Discontinue XELJANZ 10 mg twice daily or XELJANZ XR 22 mg once daily after 16 weeks if adequate therapeutic response is not achieved. (2.3)  Maintenance: XELJANZ 5 mg twice daily or XELJANZ XR 11 mg once daily. For patients with loss of response during maintenance treatment, XELJANZ 10 mg twice daily or XELJANZ XR 22 mg once daily may be considered and limited to the shortest duration, with careful consideration of the benefits and risks for the individual patient. Use the lowest effective dose needed to maintain response.  (2.3)  Dosage adjustment is needed in patients with moderate and severe renal impairment or moderate hepatic impairment: see full prescribing information. (2.3)

## 2023-09-05 ENCOUNTER — TELEPHONE (OUTPATIENT)
Facility: CLINIC | Age: 23
End: 2023-09-05

## 2023-09-05 RX ORDER — TOFACITINIB 22 MG/1
22 TABLET, FILM COATED, EXTENDED RELEASE ORAL DAILY
Qty: 30 TABLET | Refills: 3 | Status: SHIPPED | OUTPATIENT
Start: 2023-09-05 | End: 2023-09-12

## 2023-09-05 NOTE — TELEPHONE ENCOUNTER
Lucie Soto from [place] is requesting pt clinical notes to proceed with a review for Aidee Rico  please follow up fax # [phone] reference # [id number]

## 2023-09-05 NOTE — TELEPHONE ENCOUNTER
Dr Raul Spencer,    I received a letter from [facility]  22 mg tablets approved     Effective 06/07/2023-09/05/2023    [id number]    I resent the prescription to [address]  called and spoke to Amarilis    He never did start the prednisone    He is still having about 10 bloody stools/day    He is supposed to take Humira this Friday and get a adalimumab level on Thursday     He will still go for his other blood work    He has not done the stool tests either but will complete them on Thursday    Should we have him skip this dose of Humira and the level and just start the Adam mawr as soon as they can deliver the medication to him?     He will call [facility] tomorrow and see how fast he can get the [place] m[place] delivered    Also he has not been to work since 08/14/23 and is asking you for a work excuse letter

## 2023-09-06 NOTE — TELEPHONE ENCOUNTER
LMTCB    I left a detailed message below with Dr Chapis Reyes instructions    I asked the pt to call me back tomorrow to make sure her received my message    I also told him to complete the stool tests

## 2023-09-06 NOTE — TELEPHONE ENCOUNTER
Thanks Nadira Redd. Please advise David Stockton to take the next dose of Humira on schedule until he has the [place] in his hands. Start the [place] as soon as he receives it. Yes, please ask David Jamesonpagealejandra to come in for the previous labs, ideally right before his next Humira injection. At this point fine to continue without prednisone.   Usually things will improve within 3-5 days of starting the [place].    - cb

## 2023-09-07 ENCOUNTER — LAB ENCOUNTER (OUTPATIENT)
Dept: LAB | Facility: HOSPITAL | Age: 23
End: 2023-09-07
Attending: INTERNAL MEDICINE
Payer: MEDICAID

## 2023-09-07 DIAGNOSIS — Z79.620 ENCOUNTER FOR MONITORING OF ADALIMUMAB THERAPY: ICD-10-CM

## 2023-09-07 DIAGNOSIS — R19.7 BLOODY DIARRHEA: ICD-10-CM

## 2023-09-07 DIAGNOSIS — R19.7 DIARRHEA, UNSPECIFIED TYPE: ICD-10-CM

## 2023-09-07 DIAGNOSIS — K51.011 ULCERATIVE PANCOLITIS WITH RECTAL BLEEDING (HCC): ICD-10-CM

## 2023-09-07 DIAGNOSIS — Z51.81 ENCOUNTER FOR MONITORING OF ADALIMUMAB THERAPY: ICD-10-CM

## 2023-09-07 LAB
C DIFF TOX B STL QL: NEGATIVE
CRP SERPL-MCNC: 0.48 MG/DL (ref ?–0.3)
DEPRECATED RDW RBC AUTO: 37.2 FL (ref 35.1–46.3)
ERYTHROCYTE [DISTWIDTH] IN BLOOD BY AUTOMATED COUNT: 14.9 % (ref 11–15)
HCT VFR BLD AUTO: 32.4 %
HGB BLD-MCNC: 9.7 G/DL
MCH RBC QN AUTO: 21 PG (ref 26–34)
MCHC RBC AUTO-ENTMCNC: 29.9 G/DL (ref 31–37)
MCV RBC AUTO: 70 FL
PLATELET # BLD AUTO: 463 10(3)UL (ref 150–450)
RBC # BLD AUTO: 4.63 X10(6)UL
WBC # BLD AUTO: 15.5 X10(3) UL (ref 4–11)

## 2023-09-07 PROCEDURE — 80299 QUANTITATIVE ASSAY DRUG: CPT

## 2023-09-07 PROCEDURE — 82397 CHEMILUMINESCENT ASSAY: CPT

## 2023-09-07 PROCEDURE — 86140 C-REACTIVE PROTEIN: CPT

## 2023-09-07 PROCEDURE — 36415 COLL VENOUS BLD VENIPUNCTURE: CPT

## 2023-09-07 PROCEDURE — 85027 COMPLETE CBC AUTOMATED: CPT

## 2023-09-07 PROCEDURE — 83993 ASSAY FOR CALPROTECTIN FECAL: CPT

## 2023-09-07 PROCEDURE — 87493 C DIFF AMPLIFIED PROBE: CPT

## 2023-09-11 ENCOUNTER — OFFICE VISIT (OUTPATIENT)
Facility: CLINIC | Age: 23
End: 2023-09-11

## 2023-09-11 VITALS
HEART RATE: 64 BPM | SYSTOLIC BLOOD PRESSURE: 108 MMHG | HEIGHT: 68 IN | DIASTOLIC BLOOD PRESSURE: 78 MMHG | BODY MASS INDEX: 21.98 KG/M2 | WEIGHT: 145 LBS

## 2023-09-11 DIAGNOSIS — R19.5 ELEVATED FECAL CALPROTECTIN: ICD-10-CM

## 2023-09-11 DIAGNOSIS — K51.011 ULCERATIVE PANCOLITIS WITH RECTAL BLEEDING (HCC): Primary | ICD-10-CM

## 2023-09-11 DIAGNOSIS — D50.0 IRON DEFICIENCY ANEMIA DUE TO CHRONIC BLOOD LOSS: ICD-10-CM

## 2023-09-11 DIAGNOSIS — K51.911: ICD-10-CM

## 2023-09-11 DIAGNOSIS — R74.8 ALKALINE PHOSPHATASE ELEVATION: ICD-10-CM

## 2023-09-11 DIAGNOSIS — R79.82 ELEVATED C-REACTIVE PROTEIN (CRP): ICD-10-CM

## 2023-09-11 LAB — CALPROTECTIN STL-MCNT: 651 ΜG/G (ref ?–50)

## 2023-09-11 PROCEDURE — 3078F DIAST BP <80 MM HG: CPT | Performed by: INTERNAL MEDICINE

## 2023-09-11 PROCEDURE — 3008F BODY MASS INDEX DOCD: CPT | Performed by: INTERNAL MEDICINE

## 2023-09-11 PROCEDURE — 3074F SYST BP LT 130 MM HG: CPT | Performed by: INTERNAL MEDICINE

## 2023-09-11 PROCEDURE — 99215 OFFICE O/P EST HI 40 MIN: CPT | Performed by: INTERNAL MEDICINE

## 2023-09-11 RX ORDER — MESALAMINE 500 MG/1
500 CAPSULE, EXTENDED RELEASE ORAL 4 TIMES DAILY
Qty: 360 CAPSULE | Refills: 3 | Status: SHIPPED | OUTPATIENT
Start: 2023-09-11 | End: 2023-12-10

## 2023-09-11 RX ORDER — ZOSTER VACCINE RECOMBINANT, ADJUVANTED 50 MCG/0.5
1 KIT INTRAMUSCULAR ONCE
Qty: 1 EACH | Refills: 0 | Status: SHIPPED | OUTPATIENT
Start: 2023-09-11 | End: 2023-09-11

## 2023-09-11 RX ORDER — ADALIMUMAB 40MG/0.4ML
0.4 KIT SUBCUTANEOUS
COMMUNITY
Start: 2023-09-06

## 2023-09-11 RX ORDER — PNEUMOCOCCAL VACCINE POLYVALENT 25; 25; 25; 25; 25; 25; 25; 25; 25; 25; 25; 25; 25; 25; 25; 25; 25; 25; 25; 25; 25; 25; 25 UG/.5ML; UG/.5ML; UG/.5ML; UG/.5ML; UG/.5ML; UG/.5ML; UG/.5ML; UG/.5ML; UG/.5ML; UG/.5ML; UG/.5ML; UG/.5ML; UG/.5ML; UG/.5ML; UG/.5ML; UG/.5ML; UG/.5ML; UG/.5ML; UG/.5ML; UG/.5ML; UG/.5ML; UG/.5ML; UG/.5ML
0.5 INJECTION, SOLUTION INTRAMUSCULAR; SUBCUTANEOUS
Qty: 1 EACH | Refills: 0 | Status: SHIPPED | OUTPATIENT
Start: 2023-09-11

## 2023-09-11 NOTE — TELEPHONE ENCOUNTER
[facility], previous Humira prescriptions were sent to [facility]. Looks like last week's prescription for [facility] XR was sent to [facility]. Is that correct? When you have time, could you call Mavis Delarosa to help him get set up with the Shingrix vaccination? He probably needs to go to [facility].  Shingrix Rx sent in to [facility]. If possible, which is start the pneumonia vaccines. Pneumonia vaccination sent in today same [facility].     - cb

## 2023-09-12 RX ORDER — TOFACITINIB 22 MG/1
22 TABLET, FILM COATED, EXTENDED RELEASE ORAL DAILY
Qty: 30 TABLET | Refills: 3 | Status: SHIPPED | OUTPATIENT
Start: 2023-09-12 | End: 2023-10-12

## 2023-09-12 NOTE — TELEPHONE ENCOUNTER
Dr Andrew Leroy,    Yes, [facility] does use [address]    I resent the Demetris Sensing to [address] per your order

## 2023-09-15 LAB
ADALIMUMAB LVL: 4.6 UG/ML
ANTI-ADALIMUMAB AB: 58 NG/ML

## 2023-09-18 ENCOUNTER — TELEPHONE (OUTPATIENT)
Facility: CLINIC | Age: 23
End: 2023-09-18

## 2023-09-21 NOTE — TELEPHONE ENCOUNTER
Dr Marija Garsia,    I spoke to Amarilis    He started his Jadiel Garay yesterday    He will go to the [facility] for his vaccines    Does he need any follow up blood work once he's been on the Jadiel Garya for a while?

## 2023-09-21 NOTE — TELEPHONE ENCOUNTER
Well how simply MARVELOUS. Thank you so much Heather Freed for making this happen, speaking with Nael Garay, reminding him to get his vaccines. No need for any immediate blood work. Probably in the next 2-6 months we will check a CRP CBC and iron studies to follow-up. Speaking of which, could you call Nael Garay and see if you could schedule him 3 follow-up appointments over the next few months? Ideally 1 appointment in 4-6 weeks, then 2 more appointments 2-3 months apart after that.     - cb

## 2023-09-22 ENCOUNTER — TELEPHONE (OUTPATIENT)
Facility: CLINIC | Age: 23
End: 2023-09-22

## 2023-09-22 NOTE — TELEPHONE ENCOUNTER
Chief complaint:   Chief Complaint   Patient presents with   • Physical   • Gyn Exam       Vitals:  Visit Vitals  /62   Pulse 68   Ht 5' 7.32\" (1.71 m)   Wt 85.2 kg (187 lb 14.4 oz)   LMP 11/14/2022 (Approximate)   BMI 29.15 kg/m²       HISTORY OF PRESENT ILLNESS     HPI  Mert Saini is a 46 year old female who presents for complete physical examination.  She admits she feels well.  She does feel her mood is stable  She is able to do all the activities as she needs to.    Reviewed past medical, surgical and family history with her.    Other significant problems:  Patient Active Problem List    Diagnosis Date Noted   • Numbness and tingling 06/13/2014     Priority: Low   • Cervical radiculitis 06/13/2014     Priority: Low   • Hypertension 04/02/2014     Priority: Low   • Migraine 04/02/2014     Priority: Low       PAST MEDICAL, FAMILY AND SOCIAL HISTORY     Medications:  Current Outpatient Medications   Medication Sig Dispense Refill   • CALCIUM PO      • lisinopril-hydroCHLOROthiazide (ZESTORETIC) 20-25 MG per tablet Take 1 tablet by mouth daily. 90 tablet 1   • atorvastatin (Lipitor) 20 MG tablet Take 1 tablet by mouth daily. 90 tablet 1   • zolpidem (AMBIEN) 10 MG tablet Take 1 tablet by mouth nightly as needed for Sleep. 30 tablet 5   • gabapentin (NEURONTIN) 100 MG capsule TAKE 1 CAPSULE BY MOUTH TWICE DAILY 60 capsule 3   • BIOTIN PO      • sumatriptan (IMITREX) 100 MG tablet Take 1 tablet by mouth as needed for Migraine. Take 1 tablet by mouth at onset of migraine. May repeat after 2 hours if needed. 10 tablet 5   • acetaminophen (TYLENOL) 500 MG tablet Take 1,000 mg by mouth every 6 hours as needed for Pain.     • Multiple Vitamins-Minerals (WOMENS MULTI VITAMIN & MINERAL PO)        No current facility-administered medications for this visit.       Allergies:  ALLERGIES:   Allergen Reactions   • Tramadol DIZZINESS       Past Medical  History/Surgeries:  Past Medical History:   Diagnosis Date   •  Shingrix 50mcg inj one dose        Med not covered. Call plan to initiate PA.   Minimum patient age of 48 - per insurance Essential (primary) hypertension    • Head ache    • High cholesterol    • Migraine     since age 12   • PONV (postoperative nausea and vomiting)        Past Surgical History:   Procedure Laterality Date   • Carpal tunnel release Right 7-23-15   • Carpal tunnel release Left 7-9-15   • Finger surgery Right 09/13/2017    right ring finger exploration    • Gallbladder surgery      2010   • Knee cartilage surgery     • Mass excision Right 05/24/2017    right index finger mass excision   • Neck surgery     • Rotator cuff repair         Family History:  Family History   Problem Relation Age of Onset   • Cancer Mother         pancreatic cancer   • Heart disease Father    • High cholesterol Father    • Cancer Maternal Grandmother         Breast Cancer   • Diabetes Maternal Grandfather    • Other Paternal Grandmother         carotid artery disease   • Heart disease Paternal Grandfather        Social History:  Social History     Tobacco Use   • Smoking status: Never Smoker   • Smokeless tobacco: Never Used   Substance Use Topics   • Alcohol use: Not Currently     Alcohol/week: 0.8 - 1.7 standard drinks     Types: 1 - 2 Standard drinks or equivalent per week     Comment: occasionally 2 drinks a month       REVIEW OF SYSTEMS     Review of Systems   Constitutional: Negative for activity change, chills, fatigue and fever.   HENT: Negative for congestion, ear pain, mouth sores, postnasal drip, sinus pressure and sore throat.    Respiratory: Negative for cough, chest tightness, shortness of breath and wheezing.    Cardiovascular: Negative for chest pain and palpitations.   Gastrointestinal: Negative for abdominal pain, constipation, diarrhea, nausea and vomiting.   Genitourinary: Negative for menstrual problem.   Musculoskeletal: Negative for arthralgias.   Skin: Negative for rash.   Neurological: Negative for dizziness, light-headedness and headaches.   All other systems reviewed and are negative.      PHYSICAL EXAM     Physical  Exam  Vitals reviewed. Exam conducted with a chaperone present.   Constitutional:       General: She is not in acute distress.     Appearance: Normal appearance. She is normal weight. She is not ill-appearing or toxic-appearing.   HENT:      Head: Normocephalic and atraumatic.      Right Ear: Tympanic membrane, ear canal and external ear normal.      Left Ear: Tympanic membrane, ear canal and external ear normal.      Nose: Nose normal.      Mouth/Throat:      Mouth: Mucous membranes are moist.      Pharynx: Oropharynx is clear. No oropharyngeal exudate or posterior oropharyngeal erythema.      Neck: Normal range of motion and neck supple. No rigidity or tenderness.   Eyes:      Extraocular Movements: Extraocular movements intact.      Conjunctiva/sclera: Conjunctivae normal.      Pupils: Pupils are equal, round, and reactive to light.   Cardiovascular:      Rate and Rhythm: Normal rate and regular rhythm.      Pulses: Normal pulses.      Heart sounds: Normal heart sounds. No murmur heard.  Pulmonary:      Effort: Pulmonary effort is normal. No respiratory distress.      Breath sounds: Normal breath sounds and air entry. No decreased breath sounds or wheezing.   Abdominal:      General: Bowel sounds are normal.      Palpations: Abdomen is soft. There is no mass.      Tenderness: There is no abdominal tenderness.   Genitourinary:     General: Normal vulva.      Labia:         Right: No lesion.         Left: No lesion.       Vagina: Normal. No vaginal discharge.      Cervix: Normal.      Uterus: Normal.       Adnexa: Right adnexa normal and left adnexa normal.   Musculoskeletal:         General: No swelling or tenderness. Normal range of motion.   Lymphadenopathy:      Head:      Right side of head: No posterior auricular adenopathy.      Left side of head: No posterior auricular adenopathy.      Cervical: No cervical adenopathy.      Right cervical: No superficial or posterior cervical adenopathy.     Left cervical:  No superficial or posterior cervical adenopathy.      Upper Body:      Right upper body: No supraclavicular adenopathy.      Left upper body: No supraclavicular adenopathy.   Skin:     General: Skin is warm.      Findings: No erythema or rash.   Neurological:      General: No focal deficit present.      Mental Status: She is alert and oriented to person, place, and time. Mental status is at baseline.      Cranial Nerves: Cranial nerves 2-12 are intact. No cranial nerve deficit.      Sensory: Sensation is intact. No sensory deficit.      Motor: Motor function is intact. No weakness.      Coordination: Coordination is intact. Coordination normal.      Gait: Gait is intact. Gait normal.      Deep Tendon Reflexes: Reflexes are normal and symmetric. Reflexes normal.   Psychiatric:         Attention and Perception: Attention and perception normal.         Mood and Affect: Mood and affect normal.         Speech: Speech normal.         Behavior: Behavior normal. Behavior is cooperative.         Thought Content: Thought content normal.         Cognition and Memory: Cognition and memory normal.         Judgment: Judgment normal.         ASSESSMENT/PLAN     ASSESSMENT:  1. Annual physical exam    2. HTN (hypertension), benign    3. Dyslipidemia    4. Insomnia, unspecified type    5. Medication management    6. Screening for cervical cancer    7. Encounter for screening mammogram for malignant neoplasm of breast    8. Screen for colon cancer    9. History of bariatric surgery    10. Needs flu shot      PLAN:  Orders Placed This Encounter   • OPEN ACCESS COLONOSCOPY   • Mammo Screening Bilateral   • INFLUENZA QUADRIVALENT SPLIT PRES FREE 0.5 ML VACC, IM (FLULAVAL,FLUARIX,FLUZONE)   • Lipid Panel With Reflex   • Comprehensive Metabolic Panel   • CBC with Automated Differential   • Vitamin B12 And Folate   • Pap Test   • CALCIUM PO     Pap done today.  She will schedule mammogram at her convenience.  Flu vaccine given today.  She  will return at her convenience for labs due to having gastric bypass surgery in March.      Return in about 6 months (around 6/7/2023) for med check.    Recent PHQ 2/9 Score    PHQ 2:  Date Adult PHQ 2 Score Adult PHQ 2 Interpretation   12/7/2022 0 No further screening needed       PHQ 9:       DEPRESSION ASSESSMENT/PLAN:  Depression screening is negative no further plan needed.

## 2023-09-22 NOTE — TELEPHONE ENCOUNTER
Per pharmacy, Shingrix 50 mcg vaccine is $218. 19 out of pocket since pt is under 50.  PA submitted through Cover My Meds Key [id number] ,or can call later for [employer] to check status [phone]

## 2023-09-22 NOTE — TELEPHONE ENCOUNTER
Dr Stewart Brooks,    [facility]:    The pharmacy did receive the orders for the vaccines. I have to do a prior authorization for both the vaccines due to the pt's age. I will f/u on Monday and do the PA.     I'm sure the vaccines will get approved since [facility] is immunocompromised

## 2023-09-25 NOTE — TELEPHONE ENCOUNTER
Received a letter from [employer] vaccine is approved through 06/24/2023-09/22/2024    Case #: [id number]    Letter sent to scanning    I called [facility] and spoke to JOVANY Nair.  I gave him the effective dates and the case #    Yenny amaya told me the pneumonia vaccine went through for a zero co-pay    I called Rebecca English and let him know he can make an appt at [facility] [place] for his vaccines    He should schedule the vaccines a couple of weeks apart

## 2023-10-05 NOTE — TELEPHONE ENCOUNTER
I called [facility] to follow up on the prior authorizations for the Shingrix & pneumococcal vaccine    I was told both vaccines are a \"0\" co-pay.  They are just waiting for the pt to make an appt

## 2023-11-17 ENCOUNTER — TELEPHONE (OUTPATIENT)
Dept: GASTROENTEROLOGY | Facility: CLINIC | Age: 23
End: 2023-11-17

## 2023-11-17 ENCOUNTER — OFFICE VISIT (OUTPATIENT)
Dept: GASTROENTEROLOGY | Facility: CLINIC | Age: 23
End: 2023-11-17
Payer: MEDICAID

## 2023-11-17 VITALS
DIASTOLIC BLOOD PRESSURE: 84 MMHG | HEIGHT: 68 IN | WEIGHT: 154.81 LBS | TEMPERATURE: 100 F | SYSTOLIC BLOOD PRESSURE: 131 MMHG | HEART RATE: 77 BPM | BODY MASS INDEX: 23.46 KG/M2

## 2023-11-17 DIAGNOSIS — K51.011 ULCERATIVE PANCOLITIS WITH RECTAL BLEEDING (HCC): ICD-10-CM

## 2023-11-17 DIAGNOSIS — K51.011 ULCERATIVE PANCOLITIS WITH RECTAL BLEEDING (HCC): Primary | ICD-10-CM

## 2023-11-17 DIAGNOSIS — D50.0 IRON DEFICIENCY ANEMIA DUE TO CHRONIC BLOOD LOSS: Primary | ICD-10-CM

## 2023-11-17 PROCEDURE — 3079F DIAST BP 80-89 MM HG: CPT | Performed by: INTERNAL MEDICINE

## 2023-11-17 PROCEDURE — 3008F BODY MASS INDEX DOCD: CPT | Performed by: INTERNAL MEDICINE

## 2023-11-17 PROCEDURE — 3075F SYST BP GE 130 - 139MM HG: CPT | Performed by: INTERNAL MEDICINE

## 2023-11-17 PROCEDURE — 99214 OFFICE O/P EST MOD 30 MIN: CPT | Performed by: INTERNAL MEDICINE

## 2023-11-17 RX ORDER — FERROUS SULFATE 325(65) MG
325 TABLET ORAL
COMMUNITY
Start: 2023-09-25

## 2023-11-17 RX ORDER — TOFACITINIB 22 MG/1
22 TABLET, FILM COATED, EXTENDED RELEASE ORAL DAILY
COMMUNITY
Start: 2023-11-16

## 2023-11-17 NOTE — TELEPHONE ENCOUNTER
ZAKIA Childress prescribed 4 months of the induction dose Xeljanz XR on 9/20/2023. Please recall Kg Henderson for a chart check on/after 12/15/2023 to send in the prescription for the maintenance dose.     XELJANZ XR 11 mg once daily    - cb

## 2023-12-13 RX ORDER — TOFACITINIB 11 MG/1
11 TABLET, FILM COATED, EXTENDED RELEASE ORAL DAILY
Qty: 90 TABLET | Refills: 3 | Status: SHIPPED | OUTPATIENT
Start: 2023-12-13 | End: 2024-03-12

## 2023-12-13 NOTE — TELEPHONE ENCOUNTER
Dr Rosa Yanes,    I spoke to [facility]    He is aware it is time to switch over to the maintenance dose of Bernestine Dennis    He states he is doing well, no recent flares    Please send maintenance dose to [facility]

## 2023-12-13 NOTE — TELEPHONE ENCOUNTER
Thank you so much Ruth Ann Shirley. Rx for Xeljanz XR 11mg sent in to [facility].     - cb

## 2023-12-29 NOTE — TELEPHONE ENCOUNTER
Requested Prescriptions     Pending Prescriptions Disp Refills    XELJANZ XR 22 MG Oral Tablet 24 Hr [Pharmacy Med Name: XELJANZ XR 22 MG TABLET] 30 tablet 0     Sig: TAKE 1 TABLET DAILY        LOV  9/11/2023    LR   11/16/2023    sb

## 2024-01-09 RX ORDER — TOFACITINIB 22 MG/1
1 TABLET, FILM COATED, EXTENDED RELEASE ORAL DAILY
Qty: 30 TABLET | Refills: 2 | Status: SHIPPED | OUTPATIENT
Start: 2024-01-09

## 2024-01-18 ENCOUNTER — TELEPHONE (OUTPATIENT)
Facility: CLINIC | Age: 24
End: 2024-01-18

## 2024-01-18 NOTE — TELEPHONE ENCOUNTER
I called [facility] [phone]     I spoke to Ingrid Palmer, PCT    I let her know the pt is currently taking Xeljanz 11 mg as a maintenance dose    The 22 mg is not being used right now    She transferred me to Physician Services and I spoke to Ilda HAY, pt care advocate.    She put the Xeljanz 22 mg medication on hold for now    See TE from 11/17/2023

## 2024-02-16 ENCOUNTER — OFFICE VISIT (OUTPATIENT)
Dept: GASTROENTEROLOGY | Facility: CLINIC | Age: 24
End: 2024-02-16

## 2024-02-16 ENCOUNTER — LAB ENCOUNTER (OUTPATIENT)
Dept: LAB | Age: 24
End: 2024-02-16
Attending: INTERNAL MEDICINE
Payer: MEDICAID

## 2024-02-16 VITALS
SYSTOLIC BLOOD PRESSURE: 128 MMHG | HEIGHT: 68 IN | WEIGHT: 153.19 LBS | BODY MASS INDEX: 23.22 KG/M2 | DIASTOLIC BLOOD PRESSURE: 86 MMHG | HEART RATE: 64 BPM

## 2024-02-16 DIAGNOSIS — K51.011 ULCERATIVE PANCOLITIS WITH RECTAL BLEEDING (HCC): Primary | ICD-10-CM

## 2024-02-16 DIAGNOSIS — R74.8 ALKALINE PHOSPHATASE ELEVATION: ICD-10-CM

## 2024-02-16 DIAGNOSIS — D50.0 IRON DEFICIENCY ANEMIA DUE TO CHRONIC BLOOD LOSS: ICD-10-CM

## 2024-02-16 DIAGNOSIS — K51.011 ULCERATIVE PANCOLITIS WITH RECTAL BLEEDING (HCC): ICD-10-CM

## 2024-02-16 DIAGNOSIS — R79.82 ELEVATED C-REACTIVE PROTEIN (CRP): ICD-10-CM

## 2024-02-16 LAB
ALBUMIN SERPL-MCNC: 4.8 G/DL (ref 3.2–4.8)
ALBUMIN/GLOB SERPL: 1.5 {RATIO} (ref 1–2)
ALP LIVER SERPL-CCNC: 136 U/L
ALT SERPL-CCNC: 23 U/L
ANION GAP SERPL CALC-SCNC: 8 MMOL/L (ref 0–18)
AST SERPL-CCNC: 21 U/L (ref ?–34)
BILIRUB SERPL-MCNC: 0.5 MG/DL (ref 0.3–1.2)
BUN BLD-MCNC: 15 MG/DL (ref 9–23)
BUN/CREAT SERPL: 15.2 (ref 10–20)
CALCIUM BLD-MCNC: 9.5 MG/DL (ref 8.7–10.4)
CHLORIDE SERPL-SCNC: 107 MMOL/L (ref 98–112)
CO2 SERPL-SCNC: 27 MMOL/L (ref 21–32)
CREAT BLD-MCNC: 0.99 MG/DL
CRP SERPL-MCNC: <0.4 MG/DL (ref ?–1)
EGFRCR SERPLBLD CKD-EPI 2021: 110 ML/MIN/1.73M2 (ref 60–?)
FASTING STATUS PATIENT QL REPORTED: YES
GLOBULIN PLAS-MCNC: 3.1 G/DL (ref 2.8–4.4)
GLUCOSE BLD-MCNC: 88 MG/DL (ref 70–99)
OSMOLALITY SERPL CALC.SUM OF ELEC: 294 MOSM/KG (ref 275–295)
POTASSIUM SERPL-SCNC: 3.9 MMOL/L (ref 3.5–5.1)
PROT SERPL-MCNC: 7.9 G/DL (ref 5.7–8.2)
SODIUM SERPL-SCNC: 142 MMOL/L (ref 136–145)

## 2024-02-16 PROCEDURE — 80053 COMPREHEN METABOLIC PANEL: CPT

## 2024-02-16 PROCEDURE — 85027 COMPLETE CBC AUTOMATED: CPT

## 2024-02-16 PROCEDURE — 36415 COLL VENOUS BLD VENIPUNCTURE: CPT

## 2024-02-16 PROCEDURE — 86140 C-REACTIVE PROTEIN: CPT

## 2024-02-16 PROCEDURE — 99214 OFFICE O/P EST MOD 30 MIN: CPT | Performed by: INTERNAL MEDICINE

## 2024-02-16 PROCEDURE — 85060 BLOOD SMEAR INTERPRETATION: CPT

## 2024-02-16 RX ORDER — ZOSTER VACCINE RECOMBINANT, ADJUVANTED 50 MCG/0.5
1 KIT INTRAMUSCULAR ONCE
Qty: 1 EACH | Refills: 0 | Status: SHIPPED | OUTPATIENT
Start: 2024-02-16 | End: 2024-02-16

## 2024-02-16 NOTE — PROGRESS NOTES
HPI:    Patient ID: Hector Cornejo returns today for follow-up regarding his ulcerative colitis.    Rommel again walks in today looking fit and healthy.    No complaints today.    Recently stepped down to the Xeljanz 11 mg dose.    Last prednisone was likely July or August 2023.    Current IBD symptoms:  Passing about 2 bowel movements per day  No blood  No consistent abdominal pain or cramping  Maybe twice per month Hector will have an episode of 3 or 4 loose bowel movements in 1 day, possibly diet related  No abdominal cramping with previous severe flares.  Had recently experienced some mild periumbilical pain possible cramping of uncertain significance with stepping down to the lower dose Xeljanz.    Received a letter from Medicaid warning or advising that he would lose coverage.  Sounds like he is on his mother's plan.  He has followed up.  I advised close attention to this issue so that he does not get dropped due to failure to reapply or complete paperwork.    No recent labs.    We discussed ordering lab work today and proceeding with shingles vaccination.    ======================  Previous visit 11/17/2023:     Hector Cornejo returns today for follow-up regarding his ulcerative colitis.    As before, Humira prescribed 7/10/2023 had no effect.  Antibody to adalimumab on labs of 9/7/2023.  Labs 9/7/2023 overall alarming with CBC showing new anemia, hemoglobin dropping from 13.6g in June down to 9.7g MCV 70.0 on 9/7/2023, thrombocytosis platelets 463,000; fecal calprotectin 651; before that when things were better serum albumin was 3.3 on 6/12/2023 labs.    Xeljanz XR prescribed 9/20/2023.    Hector started taking this pretty much as soon as it arrived from his mail order pharmacy.    Today, Hector walks in today looking and sounding great.  Recent mild cough, URI syndrome and slight elevation in temp 99.7 on check-in today.    Otherwise, as below Hector describes resolution of previous severe colitis  symptoms.    Current IBD symptoms:  Passing 2-3 formed bowel movements per day  No blood  No nocturnal bowel movements    There was some positional presyncope symptoms with dizziness, lightheadedness, \"seeing stars\" with standing up back in September.  This has resolved.    Hector is back at work, also great news.    Overall Hector is smiling and looking much better, certainly less pale.    Iron therapy was discussed which Hector has not started.  Seems to be doing very well anyway.    No further epigastric pain, no melena.    ====================  Previous visit 9/11/2023:     Hector Cornejo returns today for expedited follow-up regarding his ulcerative colitis.    Lots has happened since previous visit 3 months ago.  Clinical picture including previous illness 2022 was highly suggestive of inflammatory bowel disease/ulcerative colitis.  Hector was very sick when he came in to see us this year on 6/5/2023.  Stool studies August 2022 consistent with inflammation/inflammatory colitis.    Hector was describing 20-30 bloody bowel movements per day in June 2023.    After the previous visit below, we got Hector in for an expedited colonoscopy examination 6/12/2023.    That colonoscopy exam showed pancolitis, Mcelroy colitis score of 2 granular beefy red mucosa pretty much the entire length of the colon.  Changes transition to fairly mild from the distal ascending colon down to the cecum.  No colonic mucosal ulcers.    I prescribed prednisone therapy 6/5/2023 which Hector started.  The colitis symptoms did gradually improve and we made a plan to transition to Humira after the colonoscopy examination.  Hector did not feel well on the prednisone.  He describes an intense epigastric abdominal pain on the prednisone therapy which has since resolved.    Hector likely received the first shipment of Humira approximately 7/10/2023 and started soon after.  He believes that he has taken 4 shots of Humira including the first 2  induction doses.    As he tapered down the prednisone, the colitis initially was stable possibly improving with the first 2 doses of Humira.    However, after his second (80mg) dose of the Humira the colitis began to flareup again.    By late August 2023, Hector was again seeing bloody stools, passing 20-30 bowel movements per day.    I again prescribed prednisone which Hector chose not to start.  He did not like how he felt on the prednisone burst in June.    Presumably on the Humira, there has been some slight improvement past 2 weeks since the phone calls 8/28/2023 onwards.    As per telephone calls, we have been very restricted with medication options with at least 3 standard of care treatments for inflammatory bowel disease denied by current [employer] product.  Denials included generic budesonide (\"not FDA indicated\"), Uceris/budesonide (simply flat out denied despite FDA indicated) and Rinvoq/upadacitinib.  Their ridiculous denial stated that we were required to induce remission in this moderate to severe UC flare with the mesalamine/Pentasa medications.  This would be rhoda malpractice and harm to the patient.    As Hector was still critically ill, with no covered option beyond mesalamine and prednisone and the current Humira, I next asked for tofacitinib (Xeljanz XR 22mg) which was approved.  That prescription has been sent into [facility] subspecialty pharmacy but Hector has not yet received it.  Unclear whether this should go to [facility].    Thus current therapy includes the previous prednisone taper and current Humira q14-day therapy.    Current IBD symptoms:  Now passing 12+ bowel movements per 24 hours with much less urgency  Flares typically involve passing 20-30+ bloody stools per 24 hours  No nocturnal bowel movements.  With flares, diarrhea bowel movements usually begin around 5 or 6 AM  No blood with stools past few days which is an improvement.  Unable to  work since 8/13/2023 as per separate letter generated today.    Hector returns for follow-up today very focused on getting better.  He is looking thin and pale.  We discussed changing from Humira to tofacitinib and the logic of trying to find the inflammatory mechanism that will control his colitis.    We briefly discussed diet and lifestyle options.  Hector has been researching diets and is concerned about making improvements, possibly current employment contributing to stress and inflammation.      Labs last week 9/7/2023 ( approx 8 wks on Humira):  CBC showing WBC 15,500, Hgb 9.7g mcv 70.0; ,000  Previous CBC 6/12/2023 showed hemoglobin 13.6g MCV 82.5, ,000  CRP 0.48 mg/dL  Previous CRP 2.79 mg/dL on 6/12/2023    Serum albumin 3.3 g/dL on 6/12/2023 9/7/2023 stool sample tested negative for C. difficile    Fecal calprotectin was 651 µg/g on stool sample collected 9/7/2023    Hepatitis B and C serologies, QuantiFERON gold all negative on specimen of 6/12/2023    ====================  Recent telephone call:    Marcela Chowdhury RN    8/28/23  6:04 PM      I spoke to Hector     Calling with UC flare     About 2-3 weeks ago pt started to have 10-15 bloody bowel movements/day     He wakes up every morning needing to have a bowel movement     He does not have any nocturnal urges     He does experience cramping prior to bowel movements but no abdominal pain     He is taking his Humira every two weeks. Pt doesn't think the Humira ever really helped him     Pt received his  first starter dose on 07/14/2023 then 2 weeks later the second 80 mg/ml dose     He has now taken 2 maintenance doses of Humira 40mg/ml. He most recently took Humira this past Friday 08/25/2023     I scheduled a follow up appt for him.      ====================  Previous visit 6/5/2023:     Hector Cornejo returns today for expedited follow-up after ED visit last week for worsening colitis.    After initial visit below August 2022  during which we discussed significantly milder symptoms suspected inflammatory bowel disease, elevated fecal calprotectin >800 of 8/5/2022 assay I explained possible inflammatory bowel disease and recommended colonoscopy examination.    After that visit, Hector took a 2-week vacation to [place] and felt a significant improvement.  He states that the diarrhea got \"75% better\" September - October 2022 and the blood resolved.    Canceled the colonoscopy exam due to feeling better, aversion to the exam and invasive testing.    Hector did well after that and over the winter until symptoms recurred and have been worsening past 6 weeks.    Current IBD symptoms:  Passing about 30 stools per day, as many as 5/hr  Significant abdominal bloating discomfort especially in the mornings  Again seeing blood with most stools, sounds like small-moderate volume  Passing multiple nocturnal stools, estimates 5 times per night.    Above symptoms sound significantly worse than July - August 2022.    No antibiotics past 2 years.  No recent treatment here or elsewhere for this problem.      ED labs 6/1/2023:  Elevated WBC 11,600, hemoglobin 13.8g, MCV 80.6  Normal renal function and electrolytes  AST 22 ALT 23 alk phosphatase 104 [45 - 117 U/L]  Serum albumin 3.5    ====================  Initial consultation 8/29/2022:     Hector Cornejo is a fit and healthy-appearing young man, BMI 22.5 with no abdominal surgery.    As per recent office visit with Dr. Clayton of 8/4/2022, Mr. Cornejo presents today for consultation regarding change in bowel patterns, diarrhea and blood in his stools.    Today, Mr. Cornejo describes onset of daily diarrhea in late June 2022.  For least 4-6 weeks, he was passing what peaked at average 6 or 7+ bowel movements per day with urgency, some cramping.  There was uncertain volume of blood associated with the diarrhea.    Reassuring labs immediately before onset of symptoms 6/29/2022 as below, although elevated CRP  then.  No previous stool studies.    Began improving spontaneously after the visit with Dr. Clayton of 8/4/2022.  Soon after seeing her, he believes he was passing about 4 bowel movements per day.    Then traveled to [place] as per recent phone calls.  Today, Hector states that the diarrhea and bleeding pretty much resolved during his time in [place].    Current symptoms:  Now passing 2-3 soft smooth bowel movements per day (normal baseline is about 2 bowel movements per day)  No more blood with the stools  No abdominal pain or cramping    Some personal stress in his life May - June 2022 of uncertain significance.    Hector describes a previous, milder episode in 2021.  He describes 2-3 months of loose stools, diarrhea in 2021 but not as bad as above.  There was some blood with the stools then too.  He saw an outside physician and this began improving, no further work-up.    Never smoker.    No family history inflammatory bowel disease.    Review of systems: No arthritis, no skin lesions or changes, no abnormal weight loss.      Stool assays of 8/5/2022  Fecal calprotectin > 800  Negative C. difficile and Giardia assays    Recent labs from the \"[facility]\" tab:  CBC 6/29/2022 showing WBC 10,000, hemoglobin 14.6g MCV 83; platelets 307,000  CMP 6/29/2022 showing AST 32 ALT 30 alk phosphatase 202 [ < 121], serum albumin 4.5    Elevated CRP 15 [ < 11] on 6/29/2022    Negative H. pylori IgG serology 7/12/2006 [facility]    Negative Hepatitis C and HIV serologies 6/29/2022    Body mass index is 23.29 kg/m².     Wt Readings from Last 20 Encounters:   02/16/24 153 lb 3.2 oz (69.5 kg)   11/17/23 154 lb 12.8 oz (70.2 kg)   09/11/23 145 lb (65.8 kg)   06/12/23 150 lb (68 kg)   06/05/23 147 lb 8 oz (66.9 kg)   06/02/23 148 lb (67.1 kg)   08/29/22 148 lb (67.1 kg)   08/04/22 148 lb (67.1 kg)   07/21/21 153 lb (69.4 kg)   02/13/19 148 lb 13 oz (67.5 kg) (46%, Z= -0.11)*     * Growth percentiles are based on  [facility] (Boys, 2-20 Years) data.         Previous EGD examination: n  Previous colonoscopy(ies): n    Diarrhea     Ulcerative Colitis        Review of Systems   Gastrointestinal:  Positive for diarrhea.       ===================    [facility]  IMPRESSION:       This exam was dictated at [facility].    Performed by [facility]  DATE OF EXAM: 12/10/2021 9:59 AM     EXAMINATION: US ABDOMEN LTD     Bryan Bullock MD - 12/10/2021       DATE OF EXAM: 12/10/2021 9:59 AM   EXAMINATION: US ABDOMEN LTD     HISTORY: K40.90: Inguinal hernia     COMPARISON:  None       FINDINGS:  Grayscale and color sonographic examination of the left inguinal region in the area of pain was performed. There is no sonographic evidence of wall defect. There is a suspected benign-appearing lymph node which measures up to 0.4 cm in the short axis.     IMPRESSION:       No sonographic evidence of left inguinal hernia.         Electronically Verified and Signed by Attending Radiologist: Bryan Bullock MD 12/10/2021 10:45 AM   This exam was dictated at [facility].    Last Resulted: 12/10/21 10:45 AM  Received From: [facility]    =======================    [facility]     COLONOSCOPY PROCEDURE REPORT                 DATE OF PROCEDURE:  6/12/2023      PCP: Romelia Clayton MD     PREOPERATIVE DIAGNOSIS: Change in bowel patterns, bloody diarrhea, abnormal elevated fecal calprotectin level     POSTOPERATIVE DIAGNOSIS:  See impression.     SURGEON:  Phoenix Christensen M.D.     SEDATION:    MAC anesthesia provided by the Anesthesia Service.  MAC anesthesia requested due to age 23, anticipated intolerance of colonoscopy examination under safe doses conscious sedation medications     COLONOSCOPY PROCEDURE:   After the nature and risks of colonoscopy examination under MAC anesthesia were discussed with the patient and all questions answered, informed consent was  obtained.  The patient was sedated as above.       Digital rectal exam was performed which showed no masses.  The Olympus pediatric video colonoscope was placed in the patient's rectum and advanced under direct visualization through the entire length of the colon up to the cecum and terminal ileum.  .  The cecum was confirmed by landmarks including appendiceal orifice, cecal trifold, ileocecal valve.  Retroflexion was marked performed in the rectum due to active colitis.     The quality of the prep was fair.     Estimated blood loss: < 10 mL         COLONOSCOPY FINDINGS:    Diffuse chronic inflammatory changes appreciated from the anal verge all the way up to the cecum with friable granular beefy red mucosa, copious mucoid exudates but no ulcerations.  Washing, examining each segment showed no stricture or neoplasm.  Mcelroy colitis score of 2.  Inflammatory changes were continuous to the distal ascending colon where they got a bit milder, we could still see some inflammatory changes but some shiny colonic mucosa and intact vascular pattern mid and proximal ascending.  There was more inflammation and a cecal patch in the cecum.  Entirely normal ileocecal valve and terminal ileum, photographed.  Random colonic mucosal biopsies obtained in a segmental fashion ascending transverse sigmoid and rectum.        RECOMMENDATIONS:  High fiber diet.  Follow-up above colonic mucosal biopsy results.  Pancolitis; Mcelroy colitis score of 2.  Clinical impression is of inflammatory bowel disease/ulcerative colitis.  Continue recent prednisone prescription.  Begin discussions regarding starting biologic therapy, favor starting anti-TNF with severe disease described above.        PATH:    A. Random ascending colon; biopsy:  Fragments of colonic mucosa with moderate/severe active colitis, lamina propria lymphoplasmacytosis, and mild to moderate architectural distortion, see comment.     B. Random transverse colon; biopsy:  Fragments of  colonic mucosa with severe active colitis, lamina propria lymphoplasmacytosis, and mild to moderate architectural distortion, see comment.     C. Random sigmoid colon; biopsy:  Fragments of colonic mucosa with moderate/severe active colitis, lamina propria lymphoplasmacytosis, and mild to moderate architectural distortion, see comment.     D. Random rectum; biopsy:   Fragments of colonic mucosa with moderate/severe active colitis, lamina propria lymphoplasmacytosis, and mild to moderate architectural distortion, see comment.    Comment:   These findings are consistent with inflammatory bowel disease in the appropriate clinical setting, provided other etiologies including infection and vasculitis are excluded.  There is no evidence of dysplasia or granuloma in any of the biopsy fragments.           Current Outpatient Medications   Medication Sig Dispense Refill    Tofacitinib Citrate ER (XELJANZ XR) 11 MG Oral Tablet 24 Hr Take 11 mg by mouth daily. 90 tablet 3    Albuterol Sulfate  (90 Base) MCG/ACT Inhalation Aero Soln Inhale 2 puffs into the lungs every 4 (four) hours as needed for Wheezing.      Tofacitinib Citrate ER (XELJANZ XR) 22 MG Oral Tablet 24 Hr Take 1 tablet by mouth daily. (Patient not taking: Reported on 2/16/2024) 30 tablet 2    Ferrous Sulfate 325 (65 Fe) MG Oral Tab Take 1 tablet (325 mg total) by mouth daily with breakfast. (Patient not taking: Reported on 2/16/2024)      pneumococcal vaccine polyvalent (PNEUMOVAX 23) 25 mcg/0.5mL Injection Injection Inject 0.5 mL into the muscle Prior to discharge. (Patient not taking: Reported on 2/16/2024) 1 each 0    pneumococcal vaccine polyvalent (PNEUMOVAX 23) 25 mcg/0.5mL Injection Injection Inject 0.5 mL into the muscle Prior to discharge. (Patient not taking: Reported on 2/16/2024) 1 each 0    PEG 3350-KCl-Na Bicarb-NaCl 420 g Oral Recon Soln Take as directed per instructions from Dr. Christensen's office. (Patient not taking: Reported on 6/2/2023)  4000 mL 0         Allergies:No Known Allergies  Imaging: No results found.       PHYSICAL EXAM:   Physical Exam           ASSESSMENT/PLAN:     Healthy 23 year old man, seen initially 8/29/2022 with BMI 22.5, no abdominal surgical history or previous work-up for the symptoms.    He is seen in consultation 8/29/2022 after ?recovering spontaneously from at least 6-week episode of bloody diarrhea July - August 2022.  Daily bloody diarrhea very concerning for inflammatory bowel disease.  He is now improving.  He describes a milder similar episode in 2021 which also resolved spontaneously.  He has almost returned to his healthy baseline.    Reassuring CBC and serum albumin on previous labs 6/29/2022 before onset of symptoms, but elevated CRP 15 [ < 11] as above.    Reassuring abdominal exam 8/29/2022.    Non-smoker, no family history inflammatory bowel disease.    Did well after initial consultation including brief trip to [place] September 2022.  Diarrhea improved and the bleeding stopped.  Hector ended up canceling the recommended colonoscopy examination.  Continued to do well November December January following winter.    Returns for follow-up 6/5/2023 after ED evaluation 6/1/2023 for worsening colitis symptoms, likely much more severe than those initially discussed August 2022.  Describes passing approximately 30 bloody stools per day, including approximately 5 nocturnal each night.  See HPI above.    Prescribed burst of prednisone 40 mg and taper 6/5/2023.    Colonoscopy examination 6/12/2023 showed pancolitis, Mcelroy colitis score of 2 granular beefy red mucosa pretty much the entire length of the colon.  Changes transition to fairly mild from the distal ascending colon down to the cecum.  No colonic mucosal ulcers.  Diagnosis of ulcerative colitis established.    We gained approval for Humira medication mid June 2023, first dose approximately 7/10/2023.    Calls 8/28/2023 describing significant UC flare over 6 weeks/  4 doses into the Humira therapy after finishing prednisone taper likely early or mid July.  10-15 bloody bowel movements per day with abdominal cramping.  No subjective improvement on the Humira.    Humira prescribed 7/10/2023 had no effect.  Antibody to adalimumab on labs of 9/7/2023.  Labs 9/7/2023 overall alarming with CBC showing new anemia, hemoglobin dropping from 13.6g in June down to 9.7g MCV 70.0 on 9/7/2023, thrombocytosis platelets 463,000; fecal calprotectin 651; before that when things were better serum albumin was 3.3 on 6/12/2023 labs.     Xeljanz XR prescribed 9/20/2023.    Returns for follow up 11/17/2023 in symptomatic remission on the Xeljanz XR.  No new labs.    Returns for scheduled follow-up 2/16/2024 and full symptomatic remission.  Looks and sounds great.      Today's plan:     1.  Inflammatory bowel disease/ulcerative colitis initial diagnosis and workup.    First episode was a milder episode in 2021.  He describes 2-3 months of loose stools, diarrhea in 2021 but not as bad as above.  There was some blood with the stools then too.  He saw an outside physician and this began improving, no further work-up.    Second episode late June 2022: 4-6 weeks bloody diarrhea up to average 6 or 7+ bowel movements per day with urgency, some cramping.  It improved spontaneously and symptoms seem to resolve with a trip back to [place].    Fecal calprotectin level >800 on 8/6/2022    Seen by me initial consultation 8/29/2022 for the above.  Fecal calprotectin at that time sample of 8/5/2022 showed elevated CRP, fecal calprotectin > 800, Negative C. difficile and Giardia assays.  I recommended and scheduled colonoscopy examination.    Symptoms were again improving on that follow-up visits and again resolved with a 2-week trip back to [place].  Hector canceled the recommended colonoscopy examination.    Return to the ED 6/1/2023 and called for expedited follow-up of recurrence of the bloody diarrhea.  Much  sicker this year with 20-30 bloody stools per day at its peak.  Impressive iron deficiency anemia and thrombocytosis on labs of August 2023.    Seen for office visit 6/5/2023 and colonoscopy exam scheduled.    Prescribed prednisone 40mg burst 6/5/2023 which did work.    Colonoscopy examination 6/12/2023 showed pancolitis, Mcelroy colitis score of 2 granular beefy red mucosa pretty much the entire length of the colon.  Changes transition to fairly mild from the distal ascending colon down to the cecum.  No colonic mucosal ulcers.    We gained approval for Humira medication mid June 2023, first dose approximately 7/10/2023.    Calls 8/28/2023 describing significant flare over 6 weeks/ 4 doses into the Humira therapy after finishing prednisone taper likely early or mid July.  10-15 bloody bowel movements per day with abdominal cramping.  No subjective improvement on the Humira.    Labs below 9/7/2023 alarming for moderate to severe UC flare with microcytic anemia, thrombocytosis, elevated CRP, fecal calprotectin > 600.    After repeated denials for other IBD treatments, Xeljanz XR/tofacitinib 22mg approved week of 9/4/2023.       2.  Inflammatory Bowel Disease/ulcerative colitis.  Acute disease flare approximately May and June 2023.     Flares appear to mirror emotional stress  Only back to remission on Xeljanz October-November 2023     3.  Inflammatory Bowel Disease/ulcerative colitis maintenance therapy.       Hepatitis B and C serologies, QuantiFERON gold all negative on specimen of 6/12/2023    No previous IBD treatment despite 2 previous flares 2021 and 2022    Started Humira induction regimen approximately 7/10/2023 with little effect     As before, Humira prescribed 7/10/2023 had no effect.    Antibody to adalimumab on labs of 9/7/2023.  Labs 9/7/2023 overall alarming with CBC showing new anemia, hemoglobin dropping from 13.6g in June down to 9.7g MCV 70.0 on 9/7/2023, thrombocytosis platelets 463,000; fecal  calprotectin 651; before that when things were better serum albumin was 3.3 on 6/12/2023 labs.     Xeljanz XR prescribed 9/20/2023.    Now in symptomatic remission on follow up visit 11/17/2023.    Should have refills on induction dose through January 2024.  Eventually step down to maintenance dose (XELJANZ XR 11 mg once daily).    Returns for follow-up 2/16/2024 doing well on the Xeljanz XR 11 mg dose     4.  Disease and drug monitoring.      Labs last week 9/7/2023 ( approx 8 wks on Humira):    CBC showing WBC 15,500, Hgb 9.7g mcv 70.0; ,000  Previous CBC 6/12/2023 showed hemoglobin 13.6g MCV 82.5, ,000  CRP 0.48 mg/dL  Previous CRP 2.79 mg/dL on 6/12/2023    Serum albumin 3.3 g/dL on 6/12/2023 9/7/2023 stool sample tested negative for C. Difficile    Fecal calprotectin was 651 µg/g on stool sample collected 9/7/2023    Hepatitis B and C serologies, QuantiFERON gold all negative on specimen of 6/12/2023    Last colonoscopy examination was 6/12/2023 as above.    Labs ordered today including CBC CMP CRP      5.  Elevated serum alk phosphatase on outside labs 2022  CMP 6/29/2022 showing AST 32 ALT 30 alk phosphatase 202 [ < 121], serum albumin 4.5  ED labs 6/1/2023: AST 22 ALT 23 alk phosphatase 104 [45 - 117 U/L]  Continue to monitor here  CMP ordered today  If inflammatory bowel disease is diagnosed we may need to pursue further biliary work-up    6.   H. pylori history: Unknown  Not previously checked in our system  Severe epigastric pain with prednisone therapy June 2023 as below  Now resolved; consider H pylori breath test in the future     7.  C difficile history  None  Last C. difficile assay here was 9/7/2023     8.  Iron deficiency anemia  Suspect chronic blood loss due to uncontrolled ulcerative colitis  Deferred oral iron therapy  No intervention for now; continue monitoring H&H as we try to get ulcerative colitis under better control  May need more aggressive iron therapy in the  future    9.  Joint involvement  Not discussed today.  Continue to monitor.     10.  Vaccination recommendations  Shingrix and pneumonia vaccines ordered 9/21/2023  Shingrix discussed and reordered today.  May have received the first dose already; he will check with pharmacy.    11.  Skin survey, increased skin cancer risk  Needs referral to Dermatology to discuss annual skin exams while on biologic therapy.     12.  Intense epigastric abdominal pain on prednisone therapy June 2023  Consideration for EGD examination in the future  Consideration for H. pylori breath test, test and treat strategy     13.  Tertiary center consultation.  None so far.           Prior to this encounter, I spent over 10 minutes preparing for the visit, including reviewing documents from other specialties as well as from PCP and going over test results. During and after the face to face encounter, I spent an additional 45 minutes discussing the above and counseling this patient, reviewing chart notes and data with patient and composing this note.       Digital transcription software was utilized to produce this note. The note was proofread for content only. Typographical errors may remain.       Meds This Visit:  Requested Prescriptions      No prescriptions requested or ordered in this encounter       Imaging & Referrals:  None       ID#[id number]

## 2024-02-17 LAB
DEPRECATED RDW RBC AUTO: 47.1 FL (ref 35.1–46.3)
ERYTHROCYTE [DISTWIDTH] IN BLOOD BY AUTOMATED COUNT: 21.7 % (ref 11–15)
HCT VFR BLD AUTO: 38.8 %
HGB BLD-MCNC: 11.9 G/DL
MCH RBC QN AUTO: 19.8 PG (ref 26–34)
MCHC RBC AUTO-ENTMCNC: 30.7 G/DL (ref 31–37)
MCV RBC AUTO: 64.5 FL
PLATELET # BLD AUTO: 307 10(3)UL (ref 150–450)
RBC # BLD AUTO: 6.02 X10(6)UL
WBC # BLD AUTO: 7.4 X10(3) UL (ref 4–11)

## 2024-03-12 ENCOUNTER — OFFICE VISIT (OUTPATIENT)
Dept: FAMILY MEDICINE CLINIC | Facility: CLINIC | Age: 24
End: 2024-03-12

## 2024-03-12 VITALS
OXYGEN SATURATION: 99 % | BODY MASS INDEX: 23.29 KG/M2 | HEIGHT: 68 IN | RESPIRATION RATE: 22 BRPM | DIASTOLIC BLOOD PRESSURE: 75 MMHG | SYSTOLIC BLOOD PRESSURE: 129 MMHG | HEART RATE: 60 BPM | WEIGHT: 153.63 LBS

## 2024-03-12 DIAGNOSIS — K51.919 ULCERATIVE COLITIS WITH COMPLICATION, UNSPECIFIED LOCATION (HCC): ICD-10-CM

## 2024-03-12 DIAGNOSIS — Z00.00 ANNUAL PHYSICAL EXAM: Primary | ICD-10-CM

## 2024-03-12 PROCEDURE — 99395 PREV VISIT EST AGE 18-39: CPT | Performed by: FAMILY MEDICINE

## 2024-03-12 NOTE — PROGRESS NOTES
Subjective:   Patient ID: Hector Cornejo is a 23 year old male.    HPI  Here for annual physical   Doing well   Colitis controlled well with medication   Seeing GI   History/Other:   Review of Systems    Constitutional: Negative.  Negative for activity change, appetite change, diaphoresis and fatigue.     Respiratory: Negative.  Negative for apnea, cough, chest tightness and shortness of breath.    Cardiovascular: Negative.  Negative for chest pain, palpitations and leg swelling.   Gastrointestinal: see h[pi  Skin: Negative.           Psychiatric/Behavioral: Negative.        Current Outpatient Medications   Medication Sig Dispense Refill    Tofacitinib Citrate ER (XELJANZ XR) 11 MG Oral Tablet 24 Hr Take 11 mg by mouth daily. 90 tablet 3     Allergies:No Known Allergies    Objective:   Physical Exam  Constitutional:       Appearance: He is well-developed.   Cardiovascular:      Rate and Rhythm: Normal rate and regular rhythm.      Heart sounds: Normal heart sounds.   Pulmonary:      Effort: Pulmonary effort is normal.      Breath sounds: Normal breath sounds.   Abdominal:      General: Bowel sounds are normal.      Palpations: Abdomen is soft.   Skin:     General: Skin is warm and dry.   Neurological:      Mental Status: He is alert.      Deep Tendon Reflexes: Reflexes are normal and symmetric.         Assessment & Plan:     ICD-10-CM    1. Annual physical exam  Z00.00       Doing well diet and exercise discussed   2. UC - cpm per GI       No orders of the defined types were placed in this encounter.      Meds This Visit:  Requested Prescriptions      No prescriptions requested or ordered in this encounter       Imaging & Referrals:  None

## 2024-05-21 ENCOUNTER — APPOINTMENT (OUTPATIENT)
Dept: GENERAL RADIOLOGY | Facility: HOSPITAL | Age: 24
End: 2024-05-21
Attending: EMERGENCY MEDICINE

## 2024-05-21 ENCOUNTER — HOSPITAL ENCOUNTER (EMERGENCY)
Facility: HOSPITAL | Age: 24
Discharge: HOME | End: 2024-05-21
Attending: EMERGENCY MEDICINE

## 2024-05-21 VITALS
DIASTOLIC BLOOD PRESSURE: 85 MMHG | RESPIRATION RATE: 18 BRPM | HEIGHT: 68 IN | SYSTOLIC BLOOD PRESSURE: 143 MMHG | HEART RATE: 67 BPM | TEMPERATURE: 98 F | WEIGHT: 160 LBS | BODY MASS INDEX: 24.25 KG/M2 | OXYGEN SATURATION: 96 %

## 2024-05-21 DIAGNOSIS — T14.8XXA ABRASION: ICD-10-CM

## 2024-05-21 DIAGNOSIS — S92.345A CLOSED NONDISPLACED FRACTURE OF FOURTH METATARSAL BONE OF LEFT FOOT, INITIAL ENCOUNTER: Primary | ICD-10-CM

## 2024-05-21 PROCEDURE — 28470 CLTX METATARSAL FX WO MNP EA: CPT

## 2024-05-21 PROCEDURE — 99284 EMERGENCY DEPT VISIT MOD MDM: CPT

## 2024-05-21 PROCEDURE — 73630 X-RAY EXAM OF FOOT: CPT | Performed by: EMERGENCY MEDICINE
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
## 2024-05-22 ENCOUNTER — TELEPHONE (OUTPATIENT)
Dept: ORTHOPEDICS CLINIC | Facility: CLINIC | Age: 24
End: 2024-05-22

## 2024-05-22 NOTE — TELEPHONE ENCOUNTER
Patient states that he has a fracture on 4th Metatarsal and is requesting to be seen for consult sooner then first available.
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
## 2024-05-24 ENCOUNTER — OFFICE VISIT (OUTPATIENT)
Dept: ORTHOPEDICS CLINIC | Facility: CLINIC | Age: 24
End: 2024-05-24

## 2024-05-24 DIAGNOSIS — S92.342A CLOSED DISPLACED FRACTURE OF FOURTH METATARSAL BONE OF LEFT FOOT, INITIAL ENCOUNTER: Primary | ICD-10-CM

## 2024-05-24 RX ORDER — ASPIRIN 81 MG/1
81 TABLET ORAL 2 TIMES DAILY WITH MEALS
Qty: 60 TABLET | Refills: 0 | Status: SHIPPED | OUTPATIENT
Start: 2024-05-24

## 2024-05-24 RX ORDER — TRAMADOL HYDROCHLORIDE 50 MG/1
50 TABLET ORAL EVERY 12 HOURS PRN
Qty: 10 TABLET | Refills: 0 | Status: SHIPPED | OUTPATIENT
Start: 2024-05-24

## 2024-05-24 RX ORDER — EPINEPHRINE 0.3 MG/.3ML
0.3 INJECTION SUBCUTANEOUS AS NEEDED
COMMUNITY
Start: 2024-05-17

## 2024-05-24 RX ORDER — TOFACITINIB 11 MG/1
TABLET, FILM COATED, EXTENDED RELEASE ORAL
COMMUNITY
Start: 2024-05-02

## 2024-05-24 RX ORDER — ASCORBIC ACID 500 MG
500 TABLET ORAL 2 TIMES DAILY
Qty: 84 TABLET | Refills: 0 | Status: SHIPPED | OUTPATIENT
Start: 2024-05-24
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
## 2024-05-25 NOTE — PATIENT INSTRUCTIONS
Dear Hector Cornejo ,    It was a pleasure seeing you today. I saw you for your right foot and ankle injuries. Today we discussed the diagnosis, management options including conservative management, therapy, pain management including activity modification, icing, elevation, and medications, and anticipated follow-up plans.    I ordered the following for you:   - Durable Medical Equipment: Right ankle boot  - Imaging: None  - Referrals: Physical therapy  - Medications: Acetaminophen, tramadol, vitamin C, vitamin D, aspirin    Please don't hesitate to reach out if you have any questions or concerns. We will plan to follow-up next in 4 weeks.     Sincerely,  Dr. Gonzalez Waldrop     EXTREMITY FRACTURE INFORMATION PACKET  Dr. Gonzalez Waldrop  Note: this information packet goes into detail regarding surgical treatment, but is also relevant to understanding your diagnosis and non-operative treatment options  FRACTURES  Fractures, or broken bones, are common and occur in people of all ages. They may be a result of overuse (stress fracture), a fault in the bone (pathologic fracture), or from trauma such as a fall, sporting injury, or motor vehicle collision. While any bone in the body may be fractured, the most common ones we see and treat are in the arms and legs near the joints (e.g., wrists, elbows, shoulder, hip, ankle). This is because the bone in these regions is less dense and more susceptible to injury.    When fractures occur, they are typically very painful. There are nerves in/around the bones that are damaged when the bone breaks. Additionally, there are blood vessels in the bones that are damaged by a fracture and cause a large amount of swelling in the area around the injury, which contrinutes to more pain even after the bone is placed into a splint or sling.     WORKUP OF FRACTURES  Your physician will perform a history and physical examination. This examination will help to identify risk factors for  injury/reinjury and can identify other injuries. Your examination will include assessment of your motion, strength, and the function of your nerves.   You will often have X-rays performed before you see your Orthopaedic surgeon, even if others were recently taken. These X-rays will help us to assess the position of the bone, whether there are any other fractures or dislocations, and whether the bones have moved since your last X-rays. While X-rays do not show the muscles, ligaments, and tendons, some signs on these X-rays can also be used to identify an injury to these structures. Your surgeon may also order a computed tomography (CT) scan to look at the joint surface or for operative planning. These scans are not required in all patients.                   X-Ray                       CT Scan       Repair  Tibial Plateau (Knee) Fracture      TREATMENT  The treatment for a fracture depends on several factors including the fracture location, the fracture pattern (what the X-rays look like), your age/health and risk factors, and your activity level/functional needs. Your surgeon should have a discussion with you about all of these to help you come to the decision of whether to undergo surgery. When treated without surgery, your bone will be immobilized in a sling, splint, or cast to keep it from moving, which helps decrease pain and accelerate healing. These will generally remain in place until your bone is healed/stable enough to discontinue the cast/sling, which may take several weeks to months. If fractures do not heal appropriately through this treatment, a surgery may still become necessary.      X-Ray          Repair  Distal Humerus (Elbow) Fracture  Surgery for fractures may include using removable wires to pin the bone into an appropriate position, using metal plates and screws or rods to hold the bone in position, or replacing the affected joint. These treatment options are typically based on the fracture  pattern, the blood supply to the joint, and your rehabilitation needs.   Some common fractures and treatments are discussed below.    ANKLE FRACTURES                                      X-Ray                                    Repair  Ankle fractures are typically fractures of the lower portion of the fibula but can involve the tibia and frequently include injury to the ligaments of your ankle. These injuries frequently occur with a twisting motion like a bad sprain, and some can be treated very similar to an ankle sprain. In fact, there are several types of ankle fracture that can be treated with a walking boot or cast. When treated without surgery, you may be in a boot or cast for about 6-8 weeks before starting to transition back to normal walking, though this can vary. As these increase in severity (see above), the ankle joint becomes unstable and a cast may not be sufficient to help the joint stay in position. If the fracture involves the joint surface of the ankle, these are called pilon fractures and frequently necessitate surgery to keep the joint surface smooth and prevent rapid progression pf arthritis. In these settings, your surgeon will often recommend open reduction and internal fixation of the fracture. Your surgeon will have you wait 2-3 weeks after injury to have ankle fracture surgery to give time for swelling to decrease. Too much swelling will lead to healing issues in the lower leg.  During the surgery, your surgeon will correct the bone alignment, place a plate and screws, assess the ligaments, and repair/secure these as needed.     DISTAL RADIUS/WRIST FRACTURES    X-Rays of repaired distal radius  Fractures of the distal radius are grouped as those that enter the joint surface (intra-articular) and those that do not (extra-articular). Extra-articular/non-joint fractures can frequently be managed without surgery as long as the overall alignment of your wrist is reasonable. Intra-articular  fractures are typically managed with surgery to protect function and prevent wrist instability or progression to arthritis. However, in persons who are older (there is not a specific agreed-upon age range) and have less functional demands on their hands, these do just as well without surgery. When non-operative management is performed, you will usually remain in a splint for the first 2 weeks after injury and then switch over to a cast when the swelling has decreased. You will typically be in a splint or cast for 6-8 weeks from the time of injury, with very limited functionality during that time. When surgery is performed, your surgeon will correct the bone alignment, place a plate and screws and/or pins, and assess the ligaments and repair/secure these as needed. After the surgery, you will typically be placed in a wrist splint for 2 weeks and then transition to a removable splint to be worn when outside. With both operative and nonoperative management, the wrist will frequently become stiff and you may need therapy to help restore motion.    PROXIMAL HUMERUS/SHOULDER FRACTURES    X-Ray         Repair with plate/screws        X-Ray             Repair with intramedullary martha  Fractures of the proximal (upper) humerus are most commonly managed without surgery unless the fractured parts are substantially displaced in a way that will compromise your function even when healed. When managed without surgery, you will usually be placed in a sling for about 8 weeks with therapy to help protect your motion and progress your function. If your shoulder bones are severely displaced, the blood supply is injured, or your shoulder is both fractured and dislocated (moved out of its socket), your surgeon may recommend surgery. Depending on the fracture and the quality of your bone, your injury can be addressed with pinning, plates and screws, an intramedullary martha, or a shoulder replacement. Shoulder replacements are a good option  for persons with pre-existing shoulder pain or fractures with many pieces too small to hold a screw. The recovery protocol for each of these procedures varies and your surgeon will discuss these with you in detail.    PROXIMAL HUMERUS/SHOULDER FRACTURES      X-Ray          Repair (Side view)   Repair (Front view)  Fractures of the shaft of the humerus are also commonly managed without surgery except for when the nerve is injured, the fracture is severely out of alignment, or when the fracture is still unstable after 6 weeks. Additionally, distal fractures (lower end) often require surgery. When managed without surgery, you will usually be placed in a splint initially and then transitioned to a fracture brace (sometimes called a Navas brace) for a period of 8-10 weeks. You will also work with therapy to help protect your motion and progress your function. At about 6 weeks from injury, you should no longer have motion between your fracture ends (the upper and lower part of the arm)--if you still do, it's unlikely that the bone will heal.  In this setting and with severe displacement of the fracture, your surgeon may recommend surgery. Most often, your injury can be addressed with plates and screws or an intramedullary martha followed by a splint for 2 weeks and then usually a return to weightbearing and motion after your wound check.    RISKS OF SURGERY  Fracture surgeries are overall safe, but there are some risks to be aware of. Some of these risks depend on the type of anesthesia used as some patients may react to certain types of anesthesia. Additionally, while these are risks of surgery, there are many similar risks with nonoperative management, particularly including persistent pain and failure of the bone to heal appropriately. Potential risks of the surgery include:  Pain - Some amount of pain is normal after any surgery but should be manageable with your post-operative pain protocol. While this pain almost  always resolves within the first few weeks, some activities may remain bothersome and painful until your rehabilitation process is nearly complete. If you have severe pain not controlled on your postoperative pain regimen, you should let your surgeon know.  Infection - Infection after fracture surgery is uncommon (<3%) but, when it does occur, is a major issue. Sometimes this can be managed with a brief course of antibiotics. However, in other cases, the fracture site may need to undergo one or more procedures to clean out the infection. If the infection has been present for multiple weeks, it may require a complete replacement of all the implants, which is a large surgery and will set back your healing. To help avoid this risk, on the day of surgery, you will be given antibiotics and your skin and bone will be thoroughly cleaned in the operating room. For arthroplasty surgeries, your surgeon will also place additional cleaning solution and antibiotics into the wound unless you have an allergy to these.  Bleeding - Any time an incision is made to the skin, there will be some bleeding. You will typically lose a very small amount during this surgery.  Nerve Injuries - Injuries to the nerves are uncommon but very serious. The nerves are delicate and sensitive and can become injured even without cutting them, such as when traction is put on them to pull the out of the way. During fracture repair procedures, your surgeon will operate in close proximity to the nerves and will take care to avoid injury to them. While many times, injured nerves may recover to some extent, you may be referred to a nerve specialist if an injury occurs to check on the healing and anticipated recovery.  Stiffness - Any time a surgery is performed, the body attempts to heal by creating scar tissue. While this is very important to the process of healing, it also sets the joint near the fracture up for stiffness. This stiffness is exacerbated by  the use of the splint/sling to protect your limb during early healing. Too much scar tissue will cause pain and loss of motion. It will be important to work with your therapist to progress motion to prevent stiffness while protecting your surgery. If substantial stiffness does occur, additional interventions such as steroid injections and/or manipulation can be performed.   Delayed Union and Nonunion - Occasionally the fracture may not heal after surgery. If this occurs, you may work with your surgeon to determine the cause of the nonunion and whether additional surgical interventions are necessary. To minimize the risk, you should carefully adhere to the rehabilitation protocol, eat a nutritious and well-balanced diet, and avoid tobacco use.    REHABILITATION AFTER SURGERY  The course of recovery following surgery depends somewhat upon the type of procedure the surgeon performs. All patients will have some amount of pain from the surgery that will be managed with medications, ice, and elevation. Depending on your occupation, commute, and demands, you may be able to return to work within days to weeks after surgery. However, it may take several months for your fractured extremity to feel normal again.      MEDICATIONS AFTER FRACTURES AND SURGERY  Your medication regimen for fractures treated with or without surgery will include medications for pain, nausea, and bowel health. Blood clots after fractures are common so your surgeon will typically put you on a course of anticoagulation to decrease this risk. You may be given a medication to help protect your stomach while taking aspirin unless you are already taking one. To minimize narcotic use and establish better pain control, we employ a multimodal pain approach. This protocol combines the use of scheduled acetaminophen, gabapentin, and narcotics.  We will often use anti-inflammatories (NSAID's such as ibuprofen, celecoxib, and/or naproxen) to further assist with  pain control thus allowing for a lower dose of narcotic to be used. Dosing of medications will vary from patient to patient based on their needs and past medical history, so please refer to your medication list. In general, you will also receive vitamin D to improve bone health and bone density, which may also help with healing and protect from future fractures. If you take any other medications at baseline, please discuss these with your surgeon, pharmacist, or primary care manager.     ICE PACKS AFTER FRACTURES AND SURGERY   Icing can be helpful after both fractures and fracture surgery to alleviate pain and swelling. If using an ice pack, you should apply this frequently in the first week after injury/surgery. If you receive a nerve block from anesthesia (for a surgery), ensure you can feel the skin (the block has worn off) to prevent frostbite injuries. Ice bags/packs should be used for about 20 minutes every 3-4 hours during waking hours and should not be left on when sleeping. It may be used more often at first if pain/inflammation are intense. It is often helpful to protect your skin from frostbite with a washcloth, towel, or ace wrap between the ice bag/pack and your skin.    INFORMATION ABOUT YOUR SURGEON  Dr. Gonzalez Waldrop is an Orthopaedic surgeon with advanced skills in orthopaedic sports, trauma, and upper limb surgery. He completed his training at [facility]. He has authored over 50 peer-review papers and several book chapters advancing surgical techniques, surgeon education, and patient treatment. He is an assistant professor of surgery at the [employer] and [employer].                         X-Ray           Repair  Clavicle (Collar Bone) Fracture

## 2024-06-21 ENCOUNTER — OFFICE VISIT (OUTPATIENT)
Dept: ORTHOPEDICS CLINIC | Facility: CLINIC | Age: 24
End: 2024-06-21

## 2024-06-21 ENCOUNTER — HOSPITAL ENCOUNTER (OUTPATIENT)
Dept: GENERAL RADIOLOGY | Facility: HOSPITAL | Age: 24
Discharge: HOME | End: 2024-06-21
Attending: STUDENT IN AN ORGANIZED HEALTH CARE EDUCATION/TRAINING PROGRAM

## 2024-06-21 DIAGNOSIS — Z47.89 ORTHOPEDIC AFTERCARE: ICD-10-CM

## 2024-06-21 DIAGNOSIS — Z47.89 ORTHOPEDIC AFTERCARE: Primary | ICD-10-CM

## 2024-06-21 DIAGNOSIS — S92.342D CLOSED DISPLACED FRACTURE OF FOURTH METATARSAL BONE OF LEFT FOOT WITH ROUTINE HEALING, SUBSEQUENT ENCOUNTER: ICD-10-CM

## 2024-06-21 DIAGNOSIS — S92.342A CLOSED DISPLACED FRACTURE OF FOURTH METATARSAL BONE OF LEFT FOOT, INITIAL ENCOUNTER: ICD-10-CM

## 2024-06-21 PROCEDURE — 99214 OFFICE O/P EST MOD 30 MIN: CPT | Performed by: STUDENT IN AN ORGANIZED HEALTH CARE EDUCATION/TRAINING PROGRAM

## 2024-06-21 PROCEDURE — 73630 X-RAY EXAM OF FOOT: CPT | Performed by: STUDENT IN AN ORGANIZED HEALTH CARE EDUCATION/TRAINING PROGRAM

## 2024-06-21 NOTE — PROGRESS NOTES
Orthopaedic Surgery Follow-up Patient Visit  _______________________________________________________________________________________________________________  _______________________________________________________________________________________________________________    DATE OF VISIT: 6/21/2024     CHIEF COMPLAINT: Follow-up left foot fracture    Chief Complaint   Patient presents with    Fracture     L foot f/u- rates pain 3-4/10 on and off- pt is NWB using crutches and cam boot        HISTORY OF PRESENT ILLNESS: Hector Cornejo is a 24 year old male who presents to the clinic for follow-up for his left fourth metatarsal base fracture. Since last visit, he has been minimally bearing weight on the foot using a walking boot and crutches.  He reports the pain has mostly resolved though he has some pins-and-needles when he does blood pressure on the foot.  Otherwise, he feels well.  He denies any boot/splint complications.  He denies any fevers or chills or any repeat injuries.  Pain at baseline is 0 and 3-4 out of 10 when he first puts weight on it intermittently    SOCIAL HISTORY  Social History     Socioeconomic History    Marital status: Single     Spouse name: Not on file    Number of children: Not on file    Years of education: Not on file    Highest education level: Not on file   Occupational History    Occupation: student   Tobacco Use    Smoking status: Never    Smokeless tobacco: Never   Vaping Use    Vaping status: Never Used   Substance and Sexual Activity    Alcohol use: Never    Drug use: Never    Sexual activity: Not on file   Other Topics Concern    Not on file   Social History Narrative    Not on file     Social Determinants of Health     Financial Resource Strain: Not on File (3/5/2022)    Received from [facility]     Financial Resource Strain     Financial Resource Strain: 0   Food Insecurity: Not on File (3/5/2022)    Received from [facility]     Food Insecurity     Food: 0   Transportation  Needs: Not on File (3/5/2022)    Received from [facility]     Transportation Needs     Transportation: 0   Physical Activity: Not on File (3/5/2022)    Received from [facility]     Physical Activity     Physical Activity: 0   Stress: Not on File (3/5/2022)    Received from [facility]     Stress     Stress: 0   Social Connections: Not on File (3/5/2022)    Received from [facility]     Social Connections     Social Connections and Isolation: 0   Housing Stability: Not on File (3/5/2022)    Received from [facility]     Housing Stability     Housin        PAST MEDICAL HISTORY  Past Medical History:    Asthma (HCC)    Ulcerative colitis (HCC)        PAST SURGICAL HISTORY  Past Surgical History:   Procedure Laterality Date    Colonoscopy N/A 2023    Procedure: COLONOSCOPY;  Surgeon: Phoenix Christensen MD;  Location: [facility] ENDOSCOPY        MEDICATIONS  Reviewed  No outpatient medications have been marked as taking for the 24 encounter (Office Visit) with Gonzalez Waldrop MD.        ALLERGIES  No Known Allergies     FAMILY HISTORY  Family History   Problem Relation Age of Onset    Diabetes Father     Diabetes Maternal Grandfather     Diabetes Paternal Grandfather         REVIEW OF SYSTEMS  A 14 point review of systems was performed. Pertinent positives and negatives noted in the HPI.    PHYSICAL EXAM  There were no vitals taken for this visit.     Constitutional: The patient is well-developed, well-nourished, in no acute distress.  Neurological: Alert and oriented to person, place, and time.  Psychiatric: Mood and affect normal.  Head: Normocephalic and atraumatic.  Cardiovascular: regular rate by palpation  Pulmonary/Chest: Effort normal. No respiratory distress. Breathing non-labored  Abdominal: Abdomen exhibits no distension.   Left  FOOT/ANKLE  INSPECTION/PALPATION  No readily apparent visual abnormalities of the ankle.   No ecchymosis, erythema, or effusion.   No breaks in skin. Skin is  euthermic.  Neutral alignment on standing, normal gait  Normal foot arch  No focal tenderness palpation  ROM  Dorsiflexion: 20 degrees  Plantarflexion: 50 degrees  Inversion: 20 degrees  Eversion: 20 degrees  MOTOR/STRENGTH  Dorsiflexion: 5/5  Plantarflexion: 5/5  Inversion: 5/5  Eversion: 5/5  Fires EHL/FHL, able to move all toes actively  SILT Vinod/Saph/SPN/DPN/T  2+ DP/PT pulse, brisk capillary refill, foot warm & well perfused     IMAGING  I independently viewed and interpreted the imaging. Radiologist interpretation is available in the imaging report.  X-ray: Plain films of the left foot demonstrate appropriate healing of the fourth metatarsal base fracture with no visible fracture line at this time point.  No articular step-off.  No other acute osseous abnormalities    No results found.    ASSESSMENT/PLAN: Hector Cornejo is a 24 year old male who presents to the Orthopaedic surgery clinic today for follow-up.  He is now healed from his fourth metatarsal base fracture.  He should transition to first walking in his boot with full weightbearing followed by progression out of the boot into a hard soled shoe followed by normal walking shoes as pain dictates.    We discussed the diagnosis, changes, and therapies performed to date including possible treatments and their associated risks/benefits.  WEIGHT BEARING STATUS: Weightbearing as tolerated  RANGE-OF-MOTION LIMITATIONS: as tolerated  NEW PRESCRIPTIONS:  We discussed medications for this condition including patient current regimen. Based on this discussion we have added/re-ordered no additional medications  IMAGING ORDERED: none  CONSULTS PLACED: We discussed the role of therapy and/or additional specialty evaluation/intervention for this condition including previous/ongoing therapy and specialist services. Based on this discussion we have consulted physical therapy  PROSTHESES/ORTHOTICS: the patient has appropriate walking boot and no additional  prostheses/orthoses were ordered at today's visit  PROCEDURES: none    FOLLOW-UP: as needed    RADIOGRAPHS AT NEXT VISIT: none    I have personally seen Hector Cornejo and discussed in detail their plan of care. Prior to departure, they indicated agreement with and understanding of their plan of care and their follow-up as documented herein this note. Please note that this note was written in combination with voice recognition/dictation software and there is a possibility of transcription errors which were not identified at the time of note submission. If clarification is necessary, please contact the author or clinic staff.    Gonzalez Waldrop MD  Orthopaedic Surgery  6/21/2024

## 2024-06-21 NOTE — PATIENT INSTRUCTIONS
Dear Hector Cornejo ,    I saw you for your left metatarsal fracture. You have fully healed from your injury.     Thank you for trusting me and my team to help take care of your orthopaedic needs. At this point, we will not have any scheduled follow-up, but you are always welcome to return with any orthopaedic concerns, injuries, or questions. Please don't hesitate to reach out to us.    Sincerely,  Dr. Gonzalez Waldrop

## 2024-07-24 ENCOUNTER — OFFICE VISIT (OUTPATIENT)
Dept: PHYSICAL THERAPY | Age: 24
End: 2024-07-24
Attending: STUDENT IN AN ORGANIZED HEALTH CARE EDUCATION/TRAINING PROGRAM
Payer: MEDICAID

## 2024-07-24 DIAGNOSIS — S92.342D CLOSED DISPLACED FRACTURE OF FOURTH METATARSAL BONE OF LEFT FOOT WITH ROUTINE HEALING, SUBSEQUENT ENCOUNTER: Primary | ICD-10-CM

## 2024-07-24 PROCEDURE — 97110 THERAPEUTIC EXERCISES: CPT

## 2024-07-24 PROCEDURE — 97161 PT EVAL LOW COMPLEX 20 MIN: CPT
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
## 2024-07-26 ENCOUNTER — OFFICE VISIT (OUTPATIENT)
Dept: PHYSICAL THERAPY | Age: 24
End: 2024-07-26
Attending: STUDENT IN AN ORGANIZED HEALTH CARE EDUCATION/TRAINING PROGRAM
Payer: MEDICAID

## 2024-07-26 PROCEDURE — 97112 NEUROMUSCULAR REEDUCATION: CPT

## 2024-07-26 PROCEDURE — 97110 THERAPEUTIC EXERCISES: CPT

## 2024-07-26 PROCEDURE — 97140 MANUAL THERAPY 1/> REGIONS: CPT

## 2024-07-26 NOTE — PROGRESS NOTES
Dx: Closed displaced fracture of fourth metatarsal bone of left foot with routine healing, subsequent encounter (S92.342D)            Authorized # of Visits:  9 ([facility])         Next MD visit: none scheduled  Fall Risk: standard         Precautions: n/a           Date of evaluation: 7/24/24  Referring physician: Dr. Waldrop  Subjective: Patient reports he feels good today. Had pain this morning when he first started walking, but better now. No issues with HEP.     Objective:   See flow chart below      Assessment: Initiated proprioceptive training to improve foot and ankle stability on uneven surfaces. Slight ankle and foot pain reported with single leg balance but no overall increase in pain post session.     Goals (to be achieved in 8 visits):    1. Patient will be independent with HEP and it's progression  2. Patient will demo L ankle AROM at least DF 10 deg to be able to walk without deviations  3. Patient will demo L ankle strength 5/5 to be able to push off appropriately for stair negotiation and recreational activities.  4. Patient will demo normalized gait pattern to be able to walk for 1 hour with pain <1/10.       Plan: Continue PT for ankle and foot intrinsic strengthening, stretching, proprioception, HEP    Visit #2  Date: 7/26/24 Visit #3  Date:  Visit #4  Date:   TherEx (20 min):  Recumbent bike L4 x 5 min  Ankle alphabet x 1   Ankle DF/inv/ev 30x ea with GTB  Ankle PF with toe curl 20x with GTB   4\" fwd step down 20x   DL heel raise 20x  Gastroc/soleus stretch on slant x 1 min ea    NMR (10 min):  Tilt board M/L and A/P taps 20x to balance x 30\"   SLS on air ex with 3 cone touch 2x10   Tandem stance on air ex beam 2x30\" R/L foot lead           Manual (10 min):  STM dorsum of L foot, lateral ankle  Metatarsal mobs  TC dorsal glides       HEP:  Reviewed, added ankle PF with toe flexion with band   HEP: HEP:         Charges: TE x 1, NMR x 1, MM x 1       Total Timed Treatment: 40 min  Total Treatment  Time: 40 min

## 2024-07-31 ENCOUNTER — APPOINTMENT (OUTPATIENT)
Dept: PHYSICAL THERAPY | Age: 24
End: 2024-07-31
Attending: STUDENT IN AN ORGANIZED HEALTH CARE EDUCATION/TRAINING PROGRAM
Payer: MEDICAID

## 2024-08-02 ENCOUNTER — OFFICE VISIT (OUTPATIENT)
Dept: PHYSICAL THERAPY | Age: 24
End: 2024-08-02
Attending: STUDENT IN AN ORGANIZED HEALTH CARE EDUCATION/TRAINING PROGRAM
Payer: MEDICAID

## 2024-08-02 PROCEDURE — 97140 MANUAL THERAPY 1/> REGIONS: CPT

## 2024-08-02 PROCEDURE — 97110 THERAPEUTIC EXERCISES: CPT

## 2024-08-02 PROCEDURE — 97112 NEUROMUSCULAR REEDUCATION: CPT

## 2024-08-02 NOTE — PROGRESS NOTES
Dx: Closed displaced fracture of fourth metatarsal bone of left foot with routine healing, subsequent encounter (S92.342D)            Authorized # of Visits:  9 ([facility])         Next MD visit: none scheduled  Fall Risk: standard         Precautions: n/a           Date of evaluation: 7/24/24  Referring physician: Dr. Waldrop  Subjective: Patient reports he is doing well overall. Still gets pain at the end of the day up to 4/10 in foot. Denies any pain currently.     Objective:   See flow chart below      Assessment: No increased ankle pain with weightbearing activity this date, however increased L foot pain with single leg activity persists.     Goals (to be achieved in 8 visits):    1. Patient will be independent with HEP and it's progression  2. Patient will demo L ankle AROM at least DF 10 deg to be able to walk without deviations  3. Patient will demo L ankle strength 5/5 to be able to push off appropriately for stair negotiation and recreational activities.  4. Patient will demo normalized gait pattern to be able to walk for 1 hour with pain <1/10.       Plan: Continue PT for ankle and foot intrinsic strengthening, stretching, proprioception, HEP    Visit #2  Date: 7/26/24 Visit #3  Date: 8/2/24 Visit #4  Date:   TherEx (20 min):  Recumbent bike L4 x 5 min  Ankle alphabet x 1   Ankle DF/inv/ev 30x ea with GTB  Ankle PF with toe curl 20x with GTB   4\" fwd step down 20x   DL heel raise 20x  Gastroc/soleus stretch on slant x 1 min ea    NMR (10 min):  Tilt board M/L and A/P taps 20x to balance x 30\"   SLS on air ex with 3 cone touch 2x10   Tandem stance on air ex beam 2x30\" R/L foot lead       TherEx (20 min):  Recumbent bike L5 x 5 min  Ankle DF/in/ev 30x ea with GTB  Ankle PF with toe curl 30x with GTB    6\" fwd step down 20x  Lateral walk with GTB 2x30'   DL heel raise 20x   Gastroc/soleus stretch on slant x 1 min      NMR (8 min):  SLS on air ex with 3 way kick 20x  Tilt board DL M/L taps 20x to balance x  30\"  Tilt board SL A/P taps 20x to balance x 30\"         Manual (10 min):  STM dorsum of L foot, lateral ankle  Metatarsal mobs  TC dorsal glides   Manual (10 min):  STM dorsum of L foot, lateral ankle  Metatarsal mobs  TC dorsal glides    HEP:  Reviewed, added ankle PF with toe flexion with band   HEP:  Reviewed and reinforced  HEP:         Charges: TE x 1, NMR x 1, MM x 1       Total Timed Treatment: 38 min  Total Treatment Time: 38 min

## 2024-08-07 ENCOUNTER — OFFICE VISIT (OUTPATIENT)
Dept: PHYSICAL THERAPY | Age: 24
End: 2024-08-07
Attending: STUDENT IN AN ORGANIZED HEALTH CARE EDUCATION/TRAINING PROGRAM
Payer: MEDICAID

## 2024-08-07 PROCEDURE — 97110 THERAPEUTIC EXERCISES: CPT

## 2024-08-07 PROCEDURE — 97140 MANUAL THERAPY 1/> REGIONS: CPT

## 2024-08-07 PROCEDURE — 97112 NEUROMUSCULAR REEDUCATION: CPT

## 2024-08-07 NOTE — PROGRESS NOTES
Discharge Summary  Pt has attended 4 visits in Physical Therapy.     Dx: Closed displaced fracture of fourth metatarsal bone of left foot with routine healing, subsequent encounter (S92.342D)            Authorized # of Visits:  9 ([facility])         Next MD visit: none scheduled  Fall Risk: standard         Precautions: n/a           Date of evaluation: 7/24/24  Referring physician: Dr. Waldrop  Subjective: Patient reports he still has pain when he first gets up from sitting, but it is getting better, goes away quickly. Rates pain 1/10 currently.     Objective:   See flow chart below      Assessment: Patient has attended 4 visits with improved L ankle dorsiflexion allowing for improved gait pattern with decreased pain. Patient also demos improved L ankle strength and is able to negotiate stairs without difficulty. Patient has met majority of goals and will be discharged from PT at this time.     Goals (to be achieved in 8 visits):    1. Patient will be independent with HEP and it's progression - MET  2. Patient will demo L ankle AROM at least DF 10 deg to be able to walk without deviations - MET  3. Patient will demo L ankle strength 5/5 to be able to push off appropriately for stair negotiation and recreational activities. - MET  4. Patient will demo normalized gait pattern to be able to walk for 1 hour with pain <1/10. - NEARLY MET      Plan: Discharge PT    Visit #2  Date: 7/26/24 Visit #3  Date: 8/2/24 Visit #4  Date: 8/7/24   TherEx (20 min):  Recumbent bike L4 x 5 min  Ankle alphabet x 1   Ankle DF/inv/ev 30x ea with GTB  Ankle PF with toe curl 20x with GTB   4\" fwd step down 20x   DL heel raise 20x  Gastroc/soleus stretch on slant x 1 min ea    NMR (10 min):  Tilt board M/L and A/P taps 20x to balance x 30\"   SLS on air ex with 3 cone touch 2x10   Tandem stance on air ex beam 2x30\" R/L foot lead       TherEx (20 min):  Recumbent bike L5 x 5 min  Ankle DF/in/ev 30x ea with GTB  Ankle PF with toe curl 30x with  GTB    6\" fwd step down 20x  Lateral walk with GTB 2x30'   DL heel raise 20x   Gastroc/soleus stretch on slant x 1 min      NMR (8 min):  SLS on air ex with 3 way kick 20x  Tilt board DL M/L taps 20x to balance x 30\"  Tilt board SL A/P taps 20x to balance x 30\"      TherEx (20 min):  Recumbent bike L5 x 5 min  Ankle DF/in/ev 30x ea with blue TB  Ankle PF with toe curl 30x with blue TB  8\" fwd step down 20x   DL heel raise off step 20x  Gastroc/soleus stretch on slant x 1 min ea      NMR (8 min):  Tilt board DL M/L taps 20x to balance x 30\"   Tilt board SL A/P taps 20x to balance x 30\"   Lateral lunge onto dynadisc 20x   Manual (10 min):  STM dorsum of L foot, lateral ankle  Metatarsal mobs  TC dorsal glides   Manual (10 min):  STM dorsum of L foot, lateral ankle  Metatarsal mobs  TC dorsal glides Manual (10 min):  STM dorsum of L foot, lateral ankle  Metatarsal mobs  TC dorsal glides   HEP:  Reviewed, added ankle PF with toe flexion with band   HEP:  Reviewed and reinforced  HEP:  Reviewed, discussed progression to jogging once pain is completely gone         Charges: TE x 1, NMR x 1, MM x 1       Total Timed Treatment: 38 min  Total Treatment Time: 38 min

## 2024-08-23 ENCOUNTER — APPOINTMENT (OUTPATIENT)
Dept: PHYSICAL THERAPY | Age: 24
End: 2024-08-23
Attending: STUDENT IN AN ORGANIZED HEALTH CARE EDUCATION/TRAINING PROGRAM
Payer: MEDICAID

## 2024-08-23 ENCOUNTER — TELEPHONE (OUTPATIENT)
Facility: CLINIC | Age: 24
End: 2024-08-23

## 2024-08-23 NOTE — TELEPHONE ENCOUNTER
RN please follow up on Monday and do PA via covermyeds,      RN called [facility] #[phone]. Pt needs PA for xeljanz 11 mg. Previous approval was from 6/7/23 through 9/5/24.

## 2024-08-23 NOTE — TELEPHONE ENCOUNTER
[facility] called in regards to the prior authorization for the XELJANZ 22mg. Please call [phone]     XELJANZ XR 11 MG Oral Tablet 24 Hr, , Disp: , Rfl:     Key Code for cover my meds: [id number]    See telephone encounter 1/18 and 11/172023

## 2024-08-28 ENCOUNTER — APPOINTMENT (OUTPATIENT)
Dept: PHYSICAL THERAPY | Age: 24
End: 2024-08-28
Attending: STUDENT IN AN ORGANIZED HEALTH CARE EDUCATION/TRAINING PROGRAM
Payer: MEDICAID

## 2024-09-06 NOTE — TELEPHONE ENCOUNTER
I received a fax from [facility]    Xeljanz XR 11 mg is approved from 05/29/2024 through 08/27/2025    [id number]    Fax sent to scanning

## 2024-12-02 RX ORDER — TOFACITINIB 11 MG/1
1 TABLET, FILM COATED, EXTENDED RELEASE ORAL DAILY
Qty: 30 TABLET | Refills: 11 | Status: SHIPPED | OUTPATIENT
Start: 2024-12-02

## 2024-12-02 NOTE — TELEPHONE ENCOUNTER
Requested Prescriptions     Pending Prescriptions Disp Refills    XELJANZ XR 11 MG Oral Tablet 24 Hr [Pharmacy Med Name: XELJANZ XR 11 MG TABLET] 30 tablet 0     Sig: TAKE 1 TABLET DAILY       LOV 6/05/2023  LR 2/16/ 2024    em

## 2025-01-23 ENCOUNTER — PATIENT MESSAGE (OUTPATIENT)
Dept: GASTROENTEROLOGY | Facility: CLINIC | Age: 25
End: 2025-01-23

## 2025-01-23 ENCOUNTER — TELEPHONE (OUTPATIENT)
Facility: CLINIC | Age: 25
End: 2025-01-23

## 2025-01-24 NOTE — TELEPHONE ENCOUNTER
If you are able to assist, I would be extremely grateful.  You can either send the letter directly to Mr. Anne at [email or web address], or if you prefer, I can forward it on his behalf--whichever is more convenient for you.  Thank you so much for your time and consideration. Your support in this matter would mean a great deal to me.  Warm regards,  Hector Cornejo  [email or web address]  [phone]       Hector ALEXANDER [facility] Clinical Staff (supporting Phoenix Christensen MD)12 hours ago (8:05 AM)       Dear Dr. Christensen,     I hope you are doing well. I am reaching out to ask if you could kindly provide a letter confirming that I was under your care from June 2023 to February 2024 due to symptoms related to a chronic illness. 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 I did not attend any classes, and I have just received an email from the [employer] requesting tuition payment for that term.  The  at the [employer],  Mina Anne, has asked to verify I was under medical care during that time in order to consider excusing the tuition charges.

## 2025-02-14 NOTE — TELEPHONE ENCOUNTER
Letter k[employer] for school tuition.  Could someone please email the letter to the school contact and to him as per his instructions or contact him on how to get it to him and/or the school.    - cb

## 2025-02-14 NOTE — TELEPHONE ENCOUNTER
To Whom It May Concern,    Mr. Hector Cornejo has been under my care from June 2023 to February 2024 due to symptoms related to a chronic illness.  His treatment has been complicated and at one point when he was not responding to therapy we had to change to a completely different treatment.    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  He was unable to work or attend school due to the illness.  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    Without divulging private details of Hector's illness, I do not think he could have enjoyed a meaningful term of classes with his degree of illness weakness and symptoms.  Please excuse the tuition charges for the term as he was unable to attend.    The good news here is that Hector has since responded to the changes we have made in his treatment and has made a full recovery.  Hopefully he will be able to complete his course work with your institution.

## 2025-03-12 ENCOUNTER — OFFICE VISIT (OUTPATIENT)
Facility: CLINIC | Age: 25
End: 2025-03-12

## 2025-03-12 VITALS
HEIGHT: 68 IN | BODY MASS INDEX: 23.21 KG/M2 | SYSTOLIC BLOOD PRESSURE: 138 MMHG | DIASTOLIC BLOOD PRESSURE: 81 MMHG | HEART RATE: 98 BPM | WEIGHT: 153.13 LBS

## 2025-03-12 DIAGNOSIS — K51.911 EXACERBATION OF ULCERATIVE COLITIS WITH RECTAL BLEEDING (HCC): ICD-10-CM

## 2025-03-12 DIAGNOSIS — D50.0 IRON DEFICIENCY ANEMIA DUE TO CHRONIC BLOOD LOSS: Primary | ICD-10-CM

## 2025-03-12 DIAGNOSIS — K51.011 ULCERATIVE PANCOLITIS WITH RECTAL BLEEDING (HCC): ICD-10-CM

## 2025-03-12 DIAGNOSIS — L50.9 HIVES: ICD-10-CM

## 2025-03-12 PROCEDURE — 99214 OFFICE O/P EST MOD 30 MIN: CPT | Performed by: INTERNAL MEDICINE

## 2025-03-12 NOTE — PROGRESS NOTES
**  SCROLL DOWN FOR HPI **      ASSESSMENT/PLAN:     Healthy 24 year old man, seen initially 8/29/2022 with BMI 22.5 --> 23.3, no abdominal surgical history or previous work-up for the symptoms.    He was seen in consultation 8/29/2022 after ?recovering spontaneously from at least 6-week episode of bloody diarrhea July - August 2022.  Daily bloody diarrhea very concerning for inflammatory bowel disease.  He was improving.  He describes a milder similar episode in 2021 which also resolved spontaneously.      Reassuring CBC and serum albumin on previous labs 6/29/2022 before onset of symptoms, but elevated CRP 15 [ < 11] as above.    Reassuring abdominal exam 8/29/2022.    Non-smoker, no family history inflammatory bowel disease.    Did well after initial consultation including brief trip to [place] September 2022.  Diarrhea improved and the bleeding stopped.  Hector ended up canceling the recommended colonoscopy examination.  Continued to do well November December January following winter 2022 - 2023.    Returns for follow-up 6/5/2023 after ED evaluation 6/1/2023 for worsening colitis symptoms, likely much more severe than those initially discussed August 2022.  Describes passing approximately 30 bloody stools per day, including approximately 5 nocturnal each night.  See HPI above.    Prescribed burst of prednisone 40 mg and taper 6/5/2023.    Colonoscopy examination 6/12/2023 showed pancolitis, Mcelroy colitis score of 2 granular beefy red mucosa pretty much the entire length of the colon.  Changes transition to fairly mild from the distal ascending colon up to the cecum.  No colonic mucosal ulcers.  Diagnosis of ulcerative colitis established.    We gained approval for Humira medication mid June 2023, first dose approximately 7/10/2023.    Calls 8/28/2023 describing significant UC flare over 6 weeks/ 4 doses into the Humira therapy after finishing prednisone taper likely early or mid July.  10-15 bloody bowel movements  per day with abdominal cramping.  No subjective improvement on the Humira.    Humira prescribed 7/10/2023 ultimately had no effect.  Antibody to adalimumab on labs of 9/7/2023.    Labs 9/7/2023 overall alarming with CBC showing new anemia, hemoglobin dropping from 13.6g in June down to 9.7g MCV 70.0 on 9/7/2023, thrombocytosis platelets 463,000; fecal calprotectin 651; before that when things were better serum albumin was 3.3 on 6/12/2023 labs.     Xeljanz XR prescribed 9/20/2023.    Returns for follow up 11/17/2023 in symptomatic remission on the Xeljanz.  No new labs.    Returns for scheduled follow-up 2/16/2024 in full symptomatic remission.  Looks and sounds great.    Returns for follow-up 3/12/2025 describing a mild ulcerative colitis flare mid December 2024 through late February 2025 with increasingly frequent stools, then bloody stools January 2025.  This appears to have resolved spontaneously without steroid therapy, only dietary changes.      Today's plan:     1.  Inflammatory bowel disease/ulcerative colitis initial diagnosis and workup.    First episode was a milder episode in 2021.  He describes 2-3 months of loose stools, diarrhea in 2021 but not as bad as above.  There was some blood with the stools then too.  He saw an outside physician and this began improving, no further work-up.    Second episode late June 2022: 4-6 weeks bloody diarrhea up to average 6 or 7+ bowel movements per day with urgency, some cramping.  It improved spontaneously and symptoms seem to resolve with a trip back to [place].    Fecal calprotectin level >800 on 8/6/2022    Seen by me initial consultation 8/29/2022 for the above.  Fecal calprotectin at that time sample of 8/5/2022 showed elevated CRP, fecal calprotectin > 800, Negative C. difficile and Giardia assays.  I recommended and scheduled colonoscopy examination.    Symptoms were again improving on that follow-up visits and again resolved with a 2-week trip back to [place].   Hector canceled the recommended colonoscopy examination.    Return to the ED 6/1/2023 and called for expedited follow-up of recurrence of the bloody diarrhea.  Much sicker this year with 20-30 bloody stools per day at its peak.  Impressive iron deficiency anemia and thrombocytosis on labs of August 2023.    Seen for office visit 6/5/2023 and colonoscopy exam scheduled.    Prescribed prednisone 40mg burst 6/5/2023 which did work.    Colonoscopy examination 6/12/2023 showed pancolitis, Mcelroy colitis score of 2 granular beefy red mucosa pretty much the entire length of the colon.  Changes transition to fairly mild from the distal ascending colon down to the cecum.  No colonic mucosal ulcers.    We gained approval for Humira medication mid June 2023, first dose approximately 7/10/2023.    Calls 8/28/2023 describing significant flare over 6 weeks/ 4 doses into the Humira therapy after finishing prednisone taper likely early or mid July.  10-15 bloody bowel movements per day with abdominal cramping.  No subjective improvement on the Humira.    Labs below 9/7/2023 alarming for moderate to severe UC flare with microcytic anemia, thrombocytosis, elevated CRP, fecal calprotectin > 600.    After repeated denials for other IBD treatments, Xeljanz XR/tofacitinib 22mg approved week of 9/4/2023.       2.  Inflammatory Bowel Disease/ulcerative colitis.  Acute disease flare approximately May and June 2023, December 2024-February 2025.     Flares have previously mirrored emotional stress  Only back to remission on Xeljanz October-November 2023     Recent flare with increasingly frequent stools, then bloody diarrhea December 2024-January 2025, gradual improvement February 2025 with only dietary changes, going gluten-free but no steroid therapy.    3.  Inflammatory Bowel Disease/ulcerative colitis maintenance therapy.       Hepatitis B and C serologies, QuantiFERON gold all negative on specimen of 6/12/2023    No previous IBD treatment  despite 2 previous flares 2021 and 2022    Started Humira induction regimen approximately 7/10/2023 with little effect     As before, Humira prescribed 7/10/2023 had no effect.    Antibody to adalimumab on labs of 9/7/2023.  Labs 9/7/2023 overall alarming with CBC showing new anemia, hemoglobin dropping from 13.6g in June down to 9.7g MCV 70.0 on 9/7/2023, thrombocytosis platelets 463,000; fecal calprotectin 651; before that when things were better serum albumin was 3.3 on 6/12/2023 labs.     Xeljanz XR prescribed 9/20/2023.    Now in symptomatic remission on follow up visit 11/17/2023.    Should have refills on induction dose through January 2024.  Eventually step down to maintenance dose (XELJANZ XR 11 mg once daily).    Returns for follow-up 2/16/2024 doing well on the Xeljanz XR 11 mg dose    After a brief self-limited IBD flare December 2024-February 2025, Hector returns for follow-up 3/12/2025 again in symptomatic remission on the Xeljanz XR 11 mg dose     4.  Disease and drug monitoring.      Labs last week 9/7/2023 ( approx 8 wks on Humira):    CBC showing WBC 15,500, Hgb 9.7g mcv 70.0; ,000  Previous CBC 6/12/2023 showed hemoglobin 13.6g MCV 82.5, ,000  CRP 0.48 mg/dL  Previous CRP 2.79 mg/dL on 6/12/2023    Serum albumin 3.3 g/dL on 6/12/2023 9/7/2023 stool sample tested negative for C. Difficile    Fecal calprotectin was 651 µg/g on stool sample collected 9/7/2023    Hepatitis B and C serologies, QuantiFERON gold all negative on specimen of 6/12/2023    Last colonoscopy examination was 6/12/2023 as above.    Reassuring recent CBC and CMP 2/6/2025 showing resolution of previous anemia, AST 30 ALT 32 alk phosphatase 114      5.  Elevated serum alk phosphatase on outside labs 2022  CMP 6/29/2022 showing AST 32 ALT 30 alk phosphatase 202 [ < 121], serum albumin 4.5  ED labs 6/1/2023: AST 22 ALT 23 alk phosphatase 104 [45 - 117 U/L]  Mildly elevated alkaline phosphatase 114 on labs of  7/18/2024  Normal serum alk phosphatase on most recent labs 2/6/2025  Continue to monitor here      6.   H. pylori history: Unknown  Not previously checked in our system  Severe epigastric pain with prednisone therapy June 2023 as below  Now resolved; consider H pylori breath test in the future     7.  C difficile history  None  Last C. difficile assay here was 9/7/2023     8.  Iron deficiency anemia  Iron studies 5/20/2024 consistent with iron deficiency  Suspect chronic blood loss due to uncontrolled ulcerative colitis  Deferred oral iron therapy  No intervention for now; continue monitoring H&H as we try to get ulcerative colitis under better control  Appears to be resolving on labs of 2/6/2025    9.  Joint involvement  Not discussed today.  Continue to monitor.     10.  Vaccination recommendations  Shingrix and pneumonia vaccines ordered 9/21/2023  Shingrix discussed and reordered today.  May have received the first dose already; he will check with pharmacy.    11.  Skin survey, increased skin cancer risk  Needs referral to Dermatology to discuss annual skin exams while on biologic therapy.     12.  Hives/rash heat and sun  Try Xyzal, Zyrtec; consider allergy consultation    13.  Intense epigastric abdominal pain on prednisone therapy June 2023  Consideration for EGD examination in the future  Consideration for H. pylori breath test, test and treat strategy     14.  Tertiary center consultation.  None so far.           Prior to this encounter, I spent over 10 minutes preparing for the visit, including reviewing documents from other specialties as well as from PCP and going over test results. During and after the face to face encounter, I spent an additional 35 minutes today discussing the above and counseling and educating this patient, reviewing chart notes and data and documenting clinical information in the EHR, independently interpreting results and communicating results to the patient/family and composing  this comprehensive note, ordering medications or testing, referring and communicating with other healthcare providers, and care coordination with the patient's other providers.      Digital transcription software was utilized to produce this note. The note was proofread for content only. Typographical errors may remain.      Office visit 3/12/2025:   HPI:    Patient ID: Hector Cornejo returns today for follow-up regarding his ulcerative colitis.    Hector again walks in today looking strong and healthy.    As per recent emails, the dispute over him being billed for a missed semester of classes was resolved without my letter.  It sounds like they were able to verify that he was not there due to illness.  Great news.    Hector today describes a symptom flare onset approximately third week of December 2024.    In mid December, Hector describes increasingly frequent bowel movements, from baseline of 2 bowel movements per day up to 4 and then 5/day for at least 2-3 weeks.    In January 2025 this transitioned to bloody diarrhea.    Hector did not take prednisone or any medications or supplements.  He does describe going gluten-free.    Sounds like symptoms waxed and waned in February before steadily improving over the past 2 weeks.    Today, without having taken any prednisone Hector states he has seen no blood for 1 week now.  He is back to baseline 2 bowel movements per day.    As above, the only treatment or change was going gluten-free during this illness which may have helped.    Hector continues on the Xeljanz XR 11 mg once daily.    Before the recent flare, Hector has been doing very well.  He denies arthritis concerns.  He does describe recurring whole-body pruritic hives last summer whenever out in the sun and heat.  Had not found a treatment or resolution for the recurring hives.  He shows me a picture today of him sitting on the beach with hives all over his back.  Apparently hives are very  pruritic.    Dramatic improvement in labs 1 year later despite the recent bloody stools.    CBC 9/7/2023 showed hemoglobin 9.7g MCV 70.0 platelets 463,000  CBC 2/16/2024 showed hemoglobin 11.9g MCV 64.5 normal platelet count  CBC 2/6/2025 showed hemoglobin 15.4g MCV 83 platelets 273,000      ======================  Previous visit 2/16/2024:     Hector Cornejo returns today for follow-up regarding his ulcerative colitis.    Rommel again walks in today looking fit and healthy.    No complaints today.    Recently stepped down to the Xeljanz 11 mg dose.    Last prednisone was likely July or August 2023.    Current IBD symptoms:  Passing about 2 bowel movements per day  No blood  No consistent abdominal pain or cramping  Maybe twice per month Hector will have an episode of 3 or 4 loose bowel movements in 1 day, possibly diet related  No abdominal cramping with previous severe flares.  Had recently experienced some mild periumbilical pain possible cramping of uncertain significance with stepping down to the lower dose Xeljanz.    Received a letter from Medicaid warning or advising that he would lose coverage.  Sounds like he is on his mother's plan.  He has followed up.  I advised close attention to this issue so that he does not get dropped due to failure to reapply or complete paperwork.    No recent labs.    We discussed ordering lab work today and proceeding with shingles vaccination.    ======================  Previous visit 11/17/2023:     Hector Cornejo returns today for follow-up regarding his ulcerative colitis.    As before, Humira prescribed 7/10/2023 had no effect.  Antibody to adalimumab on labs of 9/7/2023.  Labs 9/7/2023 overall alarming with CBC showing new anemia, hemoglobin dropping from 13.6g in June down to 9.7g MCV 70.0 on 9/7/2023, thrombocytosis platelets 463,000; fecal calprotectin 651; before that when things were better serum albumin was 3.3 on 6/12/2023 labs.    Xeljanz XR prescribed  9/20/2023.    Hector started taking this pretty much as soon as it arrived from his mail order pharmacy.    Today, Hector walks in today looking and sounding great.  Recent mild cough, URI syndrome and slight elevation in temp 99.7 on check-in today.    Otherwise, as below Hector describes resolution of previous severe colitis symptoms.    Current IBD symptoms:  Passing 2-3 formed bowel movements per day  No blood  No nocturnal bowel movements    There was some positional presyncope symptoms with dizziness, lightheadedness, \"seeing stars\" with standing up back in September.  This has resolved.    Hector is back at work, also great news.    Overall Hector is smiling and looking much better, certainly less pale.    Iron therapy was discussed which Hector has not started.  Seems to be doing very well anyway.    No further epigastric pain, no melena.    ====================  Previous visit 9/11/2023:     Hector Cornejo returns today for expedited follow-up regarding his ulcerative colitis.    Lots has happened since previous visit 3 months ago.  Clinical picture including previous illness 2022 was highly suggestive of inflammatory bowel disease/ulcerative colitis.  Hector was very sick when he came in to see us this year on 6/5/2023.  Stool studies August 2022 consistent with inflammation/inflammatory colitis.    Hector was describing 20-30 bloody bowel movements per day in June 2023.    After the previous visit below, we got Hector in for an expedited colonoscopy examination 6/12/2023.    That colonoscopy exam showed pancolitis, Mcelroy colitis score of 2 granular beefy red mucosa pretty much the entire length of the colon.  Changes transition to fairly mild from the distal ascending colon down to the cecum.  No colonic mucosal ulcers.    I prescribed prednisone therapy 6/5/2023 which Hector started.  The colitis symptoms did gradually improve and we made a plan to transition to [facility] after the colonoscopy examination.   Hector did not feel well on the prednisone.  He describes an intense epigastric abdominal pain on the prednisone therapy which has since resolved.    Hector likely received the first shipment of Humira approximately 7/10/2023 and started soon after.  He believes that he has taken 4 shots of Humira including the first 2 induction doses.    As he tapered down the prednisone, the colitis initially was stable possibly improving with the first 2 doses of Humira.    However, after his second (80mg) dose of the Humira the colitis began to flareup again.    By late August 2023, Hector was again seeing bloody stools, passing 20-30 bowel movements per day.    I again prescribed prednisone which Hector chose not to start.  He did not like how he felt on the prednisone burst in June.    Presumably on the Humira, there has been some slight improvement past 2 weeks since the phone calls 8/28/2023 onwards.    As per telephone calls, we have been very restricted with medication options with at least 3 standard of care treatments for inflammatory bowel disease denied by current [employer] product.  Denials included generic budesonide (\"not FDA indicated\"), Uceris/budesonide (simply flat out denied despite FDA indicated) and Rinvoq/upadacitinib.  Their ridiculous denial stated that we were required to induce remission in this moderate to severe UC flare with the mesalamine/Pentasa medications.  This would be rhoda malpractice and harm to the patient.    As Hector was still critically ill, with no covered option beyond mesalamine and prednisone and the current Humira, I next asked for tofacitinib (Xeljanz XR 22mg) which was approved.  That prescription has been sent into [facility] subspecialty pharmacy but Hector has not yet received it.  Unclear whether this should go to [facility].    Thus current therapy includes the previous prednisone taper and current Humira q14-day therapy.    Current IBD  symptoms:  Now passing 12+ bowel movements per 24 hours with much less urgency  Flares typically involve passing 20-30+ bloody stools per 24 hours  No nocturnal bowel movements.  With flares, diarrhea bowel movements usually begin around 5 or 6 AM  No blood with stools past few days which is an improvement.  Unable to work since 8/13/2023 as per separate letter generated today.    Hector returns for follow-up today very focused on getting better.  He is looking thin and pale.  We discussed changing from Humira to tofacitinib and the logic of trying to find the inflammatory mechanism that will control his colitis.    We briefly discussed diet and lifestyle options.  Hector has been researching diets and is concerned about making improvements, possibly current employment contributing to stress and inflammation.      Labs last week 9/7/2023 ( approx 8 wks on Humira):  CBC showing WBC 15,500, Hgb 9.7g mcv 70.0; ,000  Previous CBC 6/12/2023 showed hemoglobin 13.6g MCV 82.5, ,000  CRP 0.48 mg/dL  Previous CRP 2.79 mg/dL on 6/12/2023    Serum albumin 3.3 g/dL on 6/12/2023 9/7/2023 stool sample tested negative for C. difficile    Fecal calprotectin was 651 µg/g on stool sample collected 9/7/2023    Hepatitis B and C serologies, QuantiFERON gold all negative on specimen of 6/12/2023    ====================  Recent telephone call:    Marcela Chowdhury RN    8/28/23  6:04 PM      I spoke to Hector     Calling with UC flare     About 2-3 weeks ago pt started to have 10-15 bloody bowel movements/day     He wakes up every morning needing to have a bowel movement     He does not have any nocturnal urges     He does experience cramping prior to bowel movements but no abdominal pain     He is taking his Humira every two weeks. Pt doesn't think the Humira ever really helped him     Pt received his  first starter dose on 07/14/2023 then 2 weeks later the second 80 mg/ml dose     He has now taken 2 maintenance  doses of Humira 40mg/ml. He most recently took Humira this past Friday 08/25/2023     I scheduled a follow up appt for him.      ====================  Previous visit 6/5/2023:     Hector Cornejo returns today for expedited follow-up after ED visit last week for worsening colitis.    After initial visit below August 2022 during which we discussed significantly milder symptoms suspected inflammatory bowel disease, elevated fecal calprotectin >800 of 8/5/2022 assay I explained possible inflammatory bowel disease and recommended colonoscopy examination.    After that visit, Hector took a [place] and felt a significant improvement.  He states that the diarrhea got \"75% better\" September - October 2022 and the blood resolved.    Canceled the colonoscopy exam due to feeling better, aversion to the exam and invasive testing.    Hector did well after that and over the winter until symptoms recurred and have been worsening past 6 weeks.    Current IBD symptoms:  Passing about 30 stools per day, as many as 5/hr  Significant abdominal bloating discomfort especially in the mornings  Again seeing blood with most stools, sounds like small-moderate volume  Passing multiple nocturnal stools, estimates 5 times per night.    Above symptoms sound significantly worse than July - August 2022.    No antibiotics past 2 years.  No recent treatment here or elsewhere for this problem.      ED labs 6/1/2023:  Elevated WBC 11,600, hemoglobin 13.8g, MCV 80.6  Normal renal function and electrolytes  AST 22 ALT 23 alk phosphatase 104 [45 - 117 U/L]  Serum albumin 3.5    ====================  Initial consultation 8/29/2022:     Hector Cornejo is a fit and healthy-appearing young man, BMI 22.5 with no abdominal surgery.    As per recent office visit with Dr. Clayton of 8/4/2022, Mr. Cornejo presents today for consultation regarding change in bowel patterns, diarrhea and blood in his stools.    Today, Mr. Cornejo describes onset of  daily diarrhea in late June 2022.  For least 4-6 weeks, he was passing what peaked at average 6 or 7+ bowel movements per day with urgency, some cramping.  There was uncertain volume of blood associated with the diarrhea.    Reassuring labs immediately before onset of symptoms 6/29/2022 as below, although elevated CRP then.  No previous stool studies.    Began improving spontaneously after the visit with Dr. Clayton of 8/4/2022.  Soon after seeing her, he believes he was passing about 4 bowel movements per day.    Then traveled to [place] as per recent phone calls.  Today, Hector states that the diarrhea and bleeding pretty much resolved during his time in [place].    Current symptoms:  Now passing 2-3 soft smooth bowel movements per day (normal baseline is about 2 bowel movements per day)  No more blood with the stools  No abdominal pain or cramping    Some personal stress in his life May - June 2022 of uncertain significance.    Hector describes a previous, milder episode in 2021.  He describes 2-3 months of loose stools, diarrhea in 2021 but not as bad as above.  There was some blood with the stools then too.  He saw an outside physician and this began improving, no further work-up.    Never smoker.    No family history inflammatory bowel disease.    Review of systems: No arthritis, no skin lesions or changes, no abnormal weight loss.      Stool assays of 8/5/2022  Fecal calprotectin > 800  Negative C. difficile and Giardia assays    Recent labs from the \"[facility]\" tab:  CBC 6/29/2022 showing WBC 10,000, hemoglobin 14.6g MCV 83; platelets 307,000  CMP 6/29/2022 showing AST 32 ALT 30 alk phosphatase 202 [ < 121], serum albumin 4.5    Elevated CRP 15 [ < 11] on 6/29/2022    Negative H. pylori IgG serology 7/12/2006 [facility]    Negative Hepatitis C and HIV serologies 6/29/2022    Body mass index is 23.28 kg/m².     Wt Readings from Last 20 Encounters:   03/12/25 153 lb 1.6 oz (69.4 kg)    05/21/24 160 lb (72.6 kg)   03/12/24 153 lb 9.6 oz (69.7 kg)   02/16/24 153 lb 3.2 oz (69.5 kg)   11/17/23 154 lb 12.8 oz (70.2 kg)   09/11/23 145 lb (65.8 kg)   06/12/23 150 lb (68 kg)   06/05/23 147 lb 8 oz (66.9 kg)   06/02/23 148 lb (67.1 kg)   08/29/22 148 lb (67.1 kg)   08/04/22 148 lb (67.1 kg)   07/21/21 153 lb (69.4 kg)   02/13/19 148 lb 13 oz (67.5 kg) (46%, Z= -0.11)*     * Growth percentiles are based on [facility] (Boys, 2-20 Years) data.         Previous EGD examination: n  Previous colonoscopy(ies): n    Diarrhea     Ulcerative Colitis      Review of Systems   Gastrointestinal:  Positive for diarrhea.       ===================    [facility]  IMPRESSION:       This exam was dictated at [facility].    Performed by [facility]  DATE OF EXAM: 12/10/2021 9:59 AM     EXAMINATION: US ABDOMEN LTD     Bryan Bullock MD - 12/10/2021       DATE OF EXAM: 12/10/2021 9:59 AM   EXAMINATION: US ABDOMEN LTD     HISTORY: K40.90: Inguinal hernia     COMPARISON:  None       FINDINGS:  Grayscale and color sonographic examination of the left inguinal region in the area of pain was performed. There is no sonographic evidence of wall defect. There is a suspected benign-appearing lymph node which measures up to 0.4 cm in the short axis.     IMPRESSION:       No sonographic evidence of left inguinal hernia.         Electronically Verified and Signed by Attending Radiologist: Bryan Bullock MD 12/10/2021 10:45 AM   This exam was dictated at [facility].    Last Resulted: 12/10/21 10:45 AM  Received From: [facility]    =======================    [facility]     COLONOSCOPY PROCEDURE REPORT                 DATE OF PROCEDURE:  6/12/2023      PCP: Romelia Clayton MD     PREOPERATIVE DIAGNOSIS: Change in bowel patterns, bloody diarrhea, abnormal elevated fecal calprotectin level     POSTOPERATIVE DIAGNOSIS:  See impression.     SURGEON:   Phoenix Christensen M.D.     SEDATION:    MAC anesthesia provided by the Anesthesia Service.  MAC anesthesia requested due to age 23, anticipated intolerance of colonoscopy examination under safe doses conscious sedation medications     COLONOSCOPY PROCEDURE:   After the nature and risks of colonoscopy examination under MAC anesthesia were discussed with the patient and all questions answered, informed consent was obtained.  The patient was sedated as above.       Digital rectal exam was performed which showed no masses.  The Olympus pediatric video colonoscope was placed in the patient's rectum and advanced under direct visualization through the entire length of the colon up to the cecum and terminal ileum.  .  The cecum was confirmed by landmarks including appendiceal orifice, cecal trifold, ileocecal valve.  Retroflexion was marked performed in the rectum due to active colitis.     The quality of the prep was fair.     Estimated blood loss: < 10 mL         COLONOSCOPY FINDINGS:    Diffuse chronic inflammatory changes appreciated from the anal verge all the way up to the cecum with friable granular beefy red mucosa, copious mucoid exudates but no ulcerations.  Washing, examining each segment showed no stricture or neoplasm.  Mcelroy colitis score of 2.  Inflammatory changes were continuous to the distal ascending colon where they got a bit milder, we could still see some inflammatory changes but some shiny colonic mucosa and intact vascular pattern mid and proximal ascending.  There was more inflammation and a cecal patch in the cecum.  Entirely normal ileocecal valve and terminal ileum, photographed.  Random colonic mucosal biopsies obtained in a segmental fashion ascending transverse sigmoid and rectum.        RECOMMENDATIONS:  High fiber diet.  Follow-up above colonic mucosal biopsy results.  Pancolitis; Mcelroy colitis score of 2.  Clinical impression is of inflammatory bowel disease/ulcerative colitis.  Continue  recent prednisone prescription.  Begin discussions regarding starting biologic therapy, favor starting anti-TNF with severe disease described above.        PATH:    A. Random ascending colon; biopsy:  Fragments of colonic mucosa with moderate/severe active colitis, lamina propria lymphoplasmacytosis, and mild to moderate architectural distortion, see comment.     B. Random transverse colon; biopsy:  Fragments of colonic mucosa with severe active colitis, lamina propria lymphoplasmacytosis, and mild to moderate architectural distortion, see comment.     C. Random sigmoid colon; biopsy:  Fragments of colonic mucosa with moderate/severe active colitis, lamina propria lymphoplasmacytosis, and mild to moderate architectural distortion, see comment.     D. Random rectum; biopsy:   Fragments of colonic mucosa with moderate/severe active colitis, lamina propria lymphoplasmacytosis, and mild to moderate architectural distortion, see comment.    Comment:   These findings are consistent with inflammatory bowel disease in the appropriate clinical setting, provided other etiologies including infection and vasculitis are excluded.  There is no evidence of dysplasia or granuloma in any of the biopsy fragments.           Current Outpatient Medications   Medication Sig Dispense Refill    Tofacitinib Citrate ER (XELJANZ XR) 11 MG Oral Tablet 24 Hr Take 1 tablet by mouth daily. 30 tablet 11    Vitamin C 500 MG Oral Tab Take 1 tablet (500 mg total) by mouth in the morning and 1 tablet (500 mg total) before bedtime. 84 tablet 0    aspirin 81 MG Oral Tab EC Take 1 tablet (81 mg total) by mouth 2 (two) times daily with meals. Start dinner tonight then breakfast/dinner for 12 days. 60 tablet 0    Acetaminophen 500 MG Oral Cap Take 2 capsules (1,000 mg total) by mouth every 8 (eight) hours as needed for Pain. Never exceed 3000 mg in a 24-hour period.  Many over-the-counter medications also have acetaminophen in them. 100 capsule 0     traMADol 50 MG Oral Tab Take 1 tablet (50 mg total) by mouth every 12 (twelve) hours as needed for Pain. 10 tablet 0    EPINEPHrine 0.3 MG/0.3ML Injection Solution Auto-injector Inject 0.3 mL (1 each total) into the muscle as needed. (Patient not taking: Reported on 3/12/2025)           Allergies:No Known Allergies  Imaging: No results found.       PHYSICAL EXAM:   Physical Exam             Meds This Visit:  Requested Prescriptions      No prescriptions requested or ordered in this encounter       Imaging & Referrals:  None       ID#[id number]

## (undated) DEVICE — KIT ENDO ORCAPOD 160/180/190

## (undated) DEVICE — KIT CLEAN ENDOKIT 1.1OZ GOWNX2

## (undated) DEVICE — Device: Brand: DUAL NARE NASAL CANNULAE FEMALE LUER CON 7FT O2 TUBE

## (undated) DEVICE — 60 ML SYRINGE REGULAR TIP: Brand: MONOJECT

## (undated) DEVICE — MEDI-VAC NON-CONDUCTIVE SUCTION TUBING 6MM X 1.8M (6FT.) L: Brand: CARDINAL HEALTH

## (undated) DEVICE — STERILE LATEX POWDER-FREE SURGICAL GLOVESWITH NITRILE COATING: Brand: PROTEXIS

## (undated) DEVICE — FORCEP RADIAL JAW 4

## (undated) NOTE — LETTER
6/1/2023          To Whom It May Concern:    Deepti Mascorro is currently under my medical care and may not return to work at this time. Please excuse Hector for 5 days. He may return to work on 6/7/2023. Activity is restricted as follows: none. If you require additional information please contact our office. Sincerely,      Calixto Cooper MD          Document generated by:   Calixto Cooper MD

## (undated) NOTE — LETTER
6/5/2023          To Whom It May Concern:    Violetta Solis is currently under my medical care and may not return to at this time. Please excuse Andre Ottawa for 3 days on 6/10/2023 to 6/12/2023  He may return to 6/13/2023 on Tuesday  Activity is restricted as follows: No restrictions    If you require additional information please contact our office.         Sincerely,    Jerry Rasheed MD

## (undated) NOTE — ED AVS SNAPSHOT
Ailyn De La O   MRN: [id number]    Department:  [facility] Emergency Department   Date of Visit:  2/13/2019           Disclosure     Insurance plans vary and the physician(s) referred by the ER may not be covered by your plan.  Please contact yo CARE PHYSICIAN AT ONCE OR RETURN IMMEDIATELY TO THE EMERGENCY DEPARTMENT. If you have been prescribed any medication(s), please fill your prescription right away and begin taking the medication(s) as directed.   If you believe that any of the medications

## (undated) NOTE — LETTER
Date & Time: 2/13/2019, 3:56 PM  Patient: Juana Fossa  Encounter Provider(s):    TAM Chiu       To Whom It May Concern:    Anne-Marie Moise was seen and treated in our department on 2/13/2019.  He may return to school 2/18/2019, no gym class

## (undated) NOTE — LETTER
[address], [place], [place]  Authorization for Invasive Procedure                                                                                           I hereby authorize Aydin Post MD, my physician and his/her assistants (if applicable), which may include medical students, residents, and/or fellows, to perform the following surgical operation/ procedure and administer such anesthesia as may be determined necessary by my physician: Operation/Procedure name (s) COLONOSCOPY on Favian Patrick   2. I recognize that during the surgical operation/procedure, unforeseen conditions may necessitate additional or different procedures than those listed above. I, therefore, further authorize and request that the above-named surgeon, assistants, or designees perform such procedures as are, in their judgment, necessary and desirable. 3.   My surgeon/physician has discussed prior to my surgery the potential benefits, risks and side effects of this procedure; the likelihood of achieving goals; and potential problems that might occur during recuperation. They also discussed reasonable alternatives to the procedure, including risks, benefits, and side effects related to the alternatives and risks related to not receiving this procedure. I have had all my questions answered and I acknowledge that no guarantee has been made as to the result that may be obtained. 4.   Should the need arise during my operation/procedure, which includes change of level of care prior to discharge, I also consent to the administration of blood and/or blood products. Further, I understand that despite careful testing and screening of blood or blood products by collecting agencies, I may still be subject to ill effects as a result of receiving a blood transfusion and/or blood products.   The following are some, but not all, of the potential risks that can occur: fever and allergic reactions, hemolytic reactions, transmission of diseases such as Hepatitis, AIDS and Cytomegalovirus (CMV) and fluid overload. In the event that I wish to have an autologous transfusion of my own blood, or a directed donor transfusion, I will discuss this with my physician. Check only if Refusing Blood or Blood Products  I understand refusal of blood or blood products as deemed necessary by my physician may have serious consequences to my condition to include possible death. I hereby assume responsibility for my refusal and release the hospital, its personnel, and my physicians from any responsibility for the consequences of my refusal.    o  Refuse   5. I authorize the use of any specimen, organs, tissues, body parts or foreign objects that may be removed from my body during the operation/procedure for diagnosis, research or teaching purposes and their subsequent disposal by hospital authorities. I also authorize the release of specimen test results and/or written reports to my treating physician on the hospital medical staff or other referring or consulting physicians involved in my care, at the discretion of the Pathologist or my treating physician. 6.   I consent to the photographing or videotaping of the operations or procedures to be performed, including appropriate portions of my body for medical, scientific, or educational purposes, provided my identity is not revealed by the pictures or by descriptive texts accompanying them. If the procedure has been photographed/videotaped, the surgeon will obtain the original picture, image, videotape or CD. The hospital will not be responsible for storage, release or maintenance of the picture, image, tape or CD.    7.   I consent to the presence of a  or observers in the operating room as deemed necessary by my physician or their designees.     8.   I recognize that in the event my procedure results in extended X-Ray/fluoroscopy time, I may develop a skin reaction. 9. If I have a Do Not Attempt Resuscitation (DNAR) order in place, that status will be suspended while in the operating room, procedural suite, and during the recovery period unless otherwise explicitly stated by me (or a person authorized to consent on my behalf). The surgeon or my attending physician will determine when the applicable recovery period ends for purposes of reinstating the DNAR order. 10. Patients having a sterilization procedure: I understand that if the procedure is successful the results will be permanent and it will therefore be impossible for me to inseminate, conceive, or bear children. I also understand that the procedure is intended to result in sterility, although the result has not been guaranteed. 11. I acknowledge that my physician has explained sedation/analgesia administration to me including the risk and benefits I consent to the administration of sedation/analgesia as may be necessary or desirable in the judgment of my physician. I CERTIFY THAT I HAVE READ AND FULLY UNDERSTAND THE ABOVE CONSENT TO OPERATION and/or OTHER PROCEDURE.     _________________________________________ _________________________________     ___________________________________  Signature of Patient     Signature of Responsible Person                   Printed Name of Responsible Person                              _________________________________________ ______________________________        ___________________________________  Signature of Witness         Date  Time         Relationship to Patient    STATEMENT OF PHYSICIAN My signature below affirms that prior to the time of the procedure; I have explained to the patient and/or his/her legal representative, the risks and benefits involved in the proposed treatment and any reasonable alternative to the proposed treatment.  I have also explained the risks and benefits involved in refusal of the proposed treatment and alternatives to the proposed treatment and have answered the patient's questions.  If I have a significant financial interest in a co-management agreement or a significant financial interest in any product or implant, or other significant relationship used in this procedure/surgery, I have disclosed this and had a discussion with my patient.     _______________________________________________________________ _____________________________  Gigi Pastel of Physician)                                                                                         (Date)                                   (Time)  Patient Name: Alex Gipson    : 2000   Printed: 2023      Medical Record #: [id number]                                              Page 1 of 1

## (undated) NOTE — LETTER
9/11/2023              Jaylan Comes        [address]         To Whom it May Concern,    Mr Kathleen Livingston is under my care for a severe chronic medical condition. He has been experiencing severe repeated disease flare ups since June 2023. His inflammation is still uncontrolled. Mr. Jennie De La O has been in contact with our office and has been unable to work since 8/13/2023. We are changing his treatment and he will be recovering soon. When he chooses to return to work, Shekhar Angel will be able to return to work without restrictions.       Sincerely,              Document electronically generated by:  Aixa Finch MD

## (undated) NOTE — LETTER
2/13/2025        RE: Hector Cornejo   [address] [address]         To Whom It May Concern,    Mr. Hector Cornejo has been under my care from June 2023 to February 2024 and onwards due to symptoms related to a chronic illness.  His treatment has been complicated and at one point when he was not responding to therapy we had to change to a completely different treatment.    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  He was unable to work or attend school due to the illness.  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    Without divulging private details of Hector's illness, I do not think he could have enjoyed a meaningful term of classes with his degree of illness weakness and symptoms.  Please excuse the tuition charges for the term as he was unable to attend.    The good news here is that Hector has since responded to the changes we have made in his treatment and has made a full recovery.  Hopefully he will be able to complete his course work with your institution.      Sincerely,                Document electronically generated by:  Phoenix Christensen MD

## (undated) NOTE — LETTER
6/20/2023        To Whom It May Concern:    Iram Marino is currently under my medical care and may not return to work at this time due to severe illness. Please excuse Mavis Delarosa from work 6/12/2023 through 6/23/2023, return to work without restrictions on Saturday 6/24/2023. If you require additional information please contact our office.         Sincerely,    Evan Boo MD          Document generated by:  Evan Boo MD